# Patient Record
Sex: MALE | Race: WHITE | NOT HISPANIC OR LATINO | Employment: FULL TIME | URBAN - METROPOLITAN AREA
[De-identification: names, ages, dates, MRNs, and addresses within clinical notes are randomized per-mention and may not be internally consistent; named-entity substitution may affect disease eponyms.]

---

## 2018-10-24 ENCOUNTER — HOSPITAL ENCOUNTER (OUTPATIENT)
Facility: HOSPITAL | Age: 53
Discharge: STILL ADMITTED OR ADMINISTRATIVE | End: 2018-10-26
Attending: EMERGENCY MEDICINE | Admitting: ORTHOPAEDIC SURGERY
Payer: COMMERCIAL

## 2018-10-24 ENCOUNTER — ANESTHESIA EVENT (OUTPATIENT)
Dept: SURGERY | Facility: HOSPITAL | Age: 53
End: 2018-10-24
Payer: COMMERCIAL

## 2018-10-24 DIAGNOSIS — T14.90XA TRAUMA: ICD-10-CM

## 2018-10-24 DIAGNOSIS — T14.8XXA FRACTURE: ICD-10-CM

## 2018-10-24 DIAGNOSIS — Z01.811 PRE-OP CHEST EXAM: ICD-10-CM

## 2018-10-24 DIAGNOSIS — S82.201A CLOSED FRACTURE OF RIGHT TIBIA AND FIBULA, INITIAL ENCOUNTER: Primary | ICD-10-CM

## 2018-10-24 DIAGNOSIS — S82.401A CLOSED FRACTURE OF RIGHT TIBIA AND FIBULA, INITIAL ENCOUNTER: Primary | ICD-10-CM

## 2018-10-24 DIAGNOSIS — S82.221A CLOSED DISPLACED TRANSVERSE FRACTURE OF SHAFT OF RIGHT TIBIA, INITIAL ENCOUNTER: ICD-10-CM

## 2018-10-24 LAB
ABO + RH BLD: NORMAL
ALBUMIN SERPL BCP-MCNC: 3.9 G/DL
ALP SERPL-CCNC: 74 U/L
ALT SERPL W/O P-5'-P-CCNC: 20 U/L
ANION GAP SERPL CALC-SCNC: 8 MMOL/L
AST SERPL-CCNC: 19 U/L
BASOPHILS # BLD AUTO: 0.03 K/UL
BASOPHILS NFR BLD: 0.4 %
BILIRUB SERPL-MCNC: 0.3 MG/DL
BLD GP AB SCN CELLS X3 SERPL QL: NORMAL
BUN SERPL-MCNC: 14 MG/DL
CALCIUM SERPL-MCNC: 9.1 MG/DL
CHLORIDE SERPL-SCNC: 106 MMOL/L
CO2 SERPL-SCNC: 25 MMOL/L
CREAT SERPL-MCNC: 1 MG/DL
DIFFERENTIAL METHOD: ABNORMAL
EOSINOPHIL # BLD AUTO: 0.2 K/UL
EOSINOPHIL NFR BLD: 2.2 %
ERYTHROCYTE [DISTWIDTH] IN BLOOD BY AUTOMATED COUNT: 12.4 %
EST. GFR  (AFRICAN AMERICAN): >60 ML/MIN/1.73 M^2
EST. GFR  (NON AFRICAN AMERICAN): >60 ML/MIN/1.73 M^2
ESTIMATED AVG GLUCOSE: 108 MG/DL
GLUCOSE SERPL-MCNC: 103 MG/DL
HBA1C MFR BLD HPLC: 5.4 %
HCT VFR BLD AUTO: 43.4 %
HGB BLD-MCNC: 14 G/DL
IMM GRANULOCYTES # BLD AUTO: 0.01 K/UL
IMM GRANULOCYTES NFR BLD AUTO: 0.1 %
INR PPP: 1
LYMPHOCYTES # BLD AUTO: 1.1 K/UL
LYMPHOCYTES NFR BLD: 15.7 %
MCH RBC QN AUTO: 27.4 PG
MCHC RBC AUTO-ENTMCNC: 32.3 G/DL
MCV RBC AUTO: 85 FL
MONOCYTES # BLD AUTO: 0.6 K/UL
MONOCYTES NFR BLD: 8.5 %
NEUTROPHILS # BLD AUTO: 5.2 K/UL
NEUTROPHILS NFR BLD: 73.1 %
NRBC BLD-RTO: 0 /100 WBC
PLATELET # BLD AUTO: 188 K/UL
PMV BLD AUTO: 10.1 FL
POTASSIUM SERPL-SCNC: 4 MMOL/L
PREALB SERPL-MCNC: 29 MG/DL
PROT SERPL-MCNC: 6.9 G/DL
PROTHROMBIN TIME: 10.1 SEC
RBC # BLD AUTO: 5.11 M/UL
SODIUM SERPL-SCNC: 139 MMOL/L
TRANSFERRIN SERPL-MCNC: 220 MG/DL
WBC # BLD AUTO: 7.14 K/UL

## 2018-10-24 PROCEDURE — 63600175 PHARM REV CODE 636 W HCPCS: Performed by: STUDENT IN AN ORGANIZED HEALTH CARE EDUCATION/TRAINING PROGRAM

## 2018-10-24 PROCEDURE — 86901 BLOOD TYPING SEROLOGIC RH(D): CPT

## 2018-10-24 PROCEDURE — 84466 ASSAY OF TRANSFERRIN: CPT

## 2018-10-24 PROCEDURE — 63600175 PHARM REV CODE 636 W HCPCS: Performed by: EMERGENCY MEDICINE

## 2018-10-24 PROCEDURE — 96374 THER/PROPH/DIAG INJ IV PUSH: CPT

## 2018-10-24 PROCEDURE — 63600175 PHARM REV CODE 636 W HCPCS: Performed by: ORTHOPAEDIC SURGERY

## 2018-10-24 PROCEDURE — 80053 COMPREHEN METABOLIC PANEL: CPT

## 2018-10-24 PROCEDURE — 93010 ELECTROCARDIOGRAM REPORT: CPT | Mod: ,,, | Performed by: INTERNAL MEDICINE

## 2018-10-24 PROCEDURE — 85025 COMPLETE CBC W/AUTO DIFF WBC: CPT

## 2018-10-24 PROCEDURE — 84134 ASSAY OF PREALBUMIN: CPT

## 2018-10-24 PROCEDURE — 99285 EMERGENCY DEPT VISIT HI MDM: CPT | Mod: ,,, | Performed by: EMERGENCY MEDICINE

## 2018-10-24 PROCEDURE — 99285 EMERGENCY DEPT VISIT HI MDM: CPT | Mod: 25

## 2018-10-24 PROCEDURE — 96376 TX/PRO/DX INJ SAME DRUG ADON: CPT

## 2018-10-24 PROCEDURE — 83036 HEMOGLOBIN GLYCOSYLATED A1C: CPT

## 2018-10-24 PROCEDURE — 85610 PROTHROMBIN TIME: CPT

## 2018-10-24 PROCEDURE — 96375 TX/PRO/DX INJ NEW DRUG ADDON: CPT

## 2018-10-24 RX ORDER — FENTANYL CITRATE 50 UG/ML
50 INJECTION, SOLUTION INTRAMUSCULAR; INTRAVENOUS ONCE
Status: COMPLETED | OUTPATIENT
Start: 2018-10-24 | End: 2018-10-24

## 2018-10-24 RX ORDER — FENTANYL CITRATE 50 UG/ML
100 INJECTION, SOLUTION INTRAMUSCULAR; INTRAVENOUS
Status: DISCONTINUED | OUTPATIENT
Start: 2018-10-24 | End: 2018-10-25 | Stop reason: HOSPADM

## 2018-10-24 RX ORDER — LORAZEPAM 2 MG/ML
3 INJECTION INTRAMUSCULAR ONCE
Status: COMPLETED | OUTPATIENT
Start: 2018-10-24 | End: 2018-10-24

## 2018-10-24 RX ORDER — FENTANYL CITRATE 50 UG/ML
100 INJECTION, SOLUTION INTRAMUSCULAR; INTRAVENOUS
Status: COMPLETED | OUTPATIENT
Start: 2018-10-24 | End: 2018-10-24

## 2018-10-24 RX ORDER — FENTANYL CITRATE 50 UG/ML
50 INJECTION, SOLUTION INTRAMUSCULAR; INTRAVENOUS ONCE
Status: DISCONTINUED | OUTPATIENT
Start: 2018-10-24 | End: 2018-10-24

## 2018-10-24 RX ADMIN — FENTANYL CITRATE 50 MCG: 50 INJECTION INTRAMUSCULAR; INTRAVENOUS at 05:10

## 2018-10-24 RX ADMIN — FENTANYL CITRATE 100 MCG: 50 INJECTION INTRAMUSCULAR; INTRAVENOUS at 08:10

## 2018-10-24 RX ADMIN — LORAZEPAM 3 MG: 2 INJECTION INTRAMUSCULAR; INTRAVENOUS at 08:10

## 2018-10-24 RX ADMIN — FENTANYL CITRATE 50 MCG: 50 INJECTION INTRAMUSCULAR; INTRAVENOUS at 06:10

## 2018-10-24 NOTE — ED PROVIDER NOTES
Encounter Date: 10/24/2018       History     Chief Complaint   Patient presents with    Leg Injury     Pt slipped while climbing on a piece of equipment and twisted his right lower leg.      This is a 52 year old male presenting after fall at work. He has no PMH. 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 His right leg hit the ground first and patient was able to crouch and ease himself to the ground on his right side. He immediately felt sharp non-radiating pain isolated to the distal right leg just proximal to the ankle with associated swelling. He was able to yell to his employees for assistance to call EMS; right leg placed in box-splint. He denies head injury, LOC,  pain anywhere else on his body, denies numbness or tingling of the right foot, denies pain at the right knee, denies taking blood thinners at home. His last meal was at 1130 this morning.           Review of patient's allergies indicates: No Known Allergies    History reviewed. No pertinent past medical history.  Past Surgical History:   Procedure Laterality Date    EYE SURGERY       History reviewed. No pertinent family history.  Social History     Tobacco Use    Smoking status: Not on file   Substance Use Topics    Alcohol use: Not on file    Drug use: Not on file     Review of Systems   Constitutional: Negative for appetite change, chills, diaphoresis and fever.   HENT: Negative for sore throat and trouble swallowing.    Eyes: Negative for photophobia, pain and discharge.   Respiratory: Negative for cough and shortness of breath.    Cardiovascular: Positive for leg swelling. Negative for chest pain and palpitations.   Gastrointestinal: Negative for abdominal pain, diarrhea, nausea and vomiting.   Genitourinary: Negative for difficulty urinating.   Musculoskeletal: Positive for joint swelling. Negative for back pain, myalgias and neck pain.   Skin: Negative for pallor and rash.    Neurological: Negative for dizziness, light-headedness, numbness and headaches.       Physical Exam     Initial Vitals [10/24/18 1658]   BP Pulse Resp Temp SpO2   (!) 150/84 84 18 -- 99 %      MAP       --         Physical Exam    Constitutional: Vital signs are normal. He appears well-developed and well-nourished. No distress.    male reclined in [facility].    HENT:   Head: Normocephalic and atraumatic.   Mouth/Throat: Oropharynx is clear and moist and mucous membranes are normal.   Eyes: Conjunctivae are normal. Pupils are equal, round, and reactive to light.   Neck: Full passive range of motion without pain. Neck supple.   Cardiovascular: Normal rate, regular rhythm and normal heart sounds.   No murmur heard.  Pulses:       Dorsalis pedis pulses are 2+ on the right side, and 2+ on the left side.   Pulmonary/Chest: Effort normal and breath sounds normal.   Abdominal: Soft. Normal appearance and bowel sounds are normal. There is no tenderness.   Musculoskeletal:   There is swelling of the distal right lower leg with tenderness to palpation around the area. No tenderness to palpation or swelling around the tibular head.    Neurological: He is alert and oriented to person, place, and time. No cranial nerve deficit or sensory deficit. GCS eye subscore is 4. GCS verbal subscore is 5. GCS motor subscore is 6. He displays no Babinski's sign on the right side.   Sensation intact within the right foot. Any to move toes to command.    Skin: Skin is warm and dry. No pallor.         ED Course   Procedures  Labs Reviewed   CBC W/ AUTO DIFFERENTIAL - Abnormal; Notable for the following components:       Result Value    Gran% 73.1 (*)     Lymph% 15.7 (*)     All other components within normal limits   COMPREHENSIVE METABOLIC PANEL   PREALBUMIN   PROTIME-INR   TRANSFERRIN   HEMOGLOBIN A1C   HEMOGLOBIN A1C    Narrative:     ADD ON GHGB PER: ANGE TOUSSAINT MD  10/24/2018  18:49   ADD ON TRSF PER: ANGE TOUSSAINT MD   10/24/2018  19:05    TRANSFERRIN    Narrative:     ADD ON GHGB PER: ANGE TOUSSAINT MD  10/24/2018  18:49   ADD ON TRSF PER: ANGE TOUSSAINT MD  10/24/2018  19:05    TYPE AND SCREEN PREOP          Imaging Results          X-Ray Knee 1 or 2 View Right (Final result)  Result time 10/24/18 18:32:01    Final result by Katharine Weinberg MD (10/24/18 18:32:01)                 Impression:      No knee fracture identified.  Known tib/fib fractures are outside of the field of view.      Electronically signed by: Katharine Weinberg MD  Date:    10/24/2018  Time:    18:32             Narrative:    EXAMINATION:  XR KNEE 1 OR 2 VIEW RIGHT    CLINICAL HISTORY:  tib fib fracture;    TECHNIQUE:  AP and lateral views of the right knee were performed.    COMPARISON:  Prior tibia/fibular radiographs from earlier same day    FINDINGS:  There is no evidence of acute fracture, dislocation, or bone destruction involving the knee.  No joint effusion is present.                               X-Ray Chest AP Portable (Final result)  Result time 10/24/18 17:46:14    Final result by Bi Tang MD (10/24/18 17:46:14)                 Impression:      No acute abnormality.      Electronically signed by: Bi Tang MD  Date:    10/24/2018  Time:    17:46             Narrative:    EXAMINATION:  XR CHEST AP PORTABLE    CLINICAL HISTORY:  Injury, unspecified, initial encounter    TECHNIQUE:  Single frontal view of the chest was performed.    COMPARISON:  None    FINDINGS:  The lungs are clear, with normal appearance of pulmonary vasculature and no pleural effusion or pneumothorax.    The cardiac silhouette is normal in size. The hilar and mediastinal contours are unremarkable.    Bones are intact.                               X-Ray Tibia Fibula 2 View Right (Final result)  Result time 10/24/18 17:56:26    Final result by Bi Tang MD (10/24/18 17:56:26)                 Impression:      Distal right tibia and fibula acute comminuted  fractures, as above.      Electronically signed by: Bi Tang MD  Date:    10/24/2018  Time:    17:56             Narrative:    EXAMINATION:  XR TIBIA FIBULA 2 VIEW RIGHT; XR ANKLE COMPLETE 3 VIEW RIGHT    CLINICAL HISTORY:  Injury, unspecified, initial encounter    TECHNIQUE:  AP and lateral views of the right tibia and fibula; AP, lateral and oblique views of the right ankle.    COMPARISON:  None.    FINDINGS:  Bones are well mineralized.  Acute comminuted fractures involving the distal diaphysis of the right tibia and also right fibula with mild impaction, mild overlap, displacement and apex angulation towards the ventral and medial aspect.  No dislocation or destructive osseous process.  There is associated overlying soft tissue swelling.  No subcutaneous emphysema or radiodense retained foreign body.                               X-Ray Ankle Complete Right (Final result)  Result time 10/24/18 17:56:26    Final result by Bi Tang MD (10/24/18 17:56:26)                 Impression:      Distal right tibia and fibula acute comminuted fractures, as above.      Electronically signed by: Bi Tang MD  Date:    10/24/2018  Time:    17:56             Narrative:    EXAMINATION:  XR TIBIA FIBULA 2 VIEW RIGHT; XR ANKLE COMPLETE 3 VIEW RIGHT    CLINICAL HISTORY:  Injury, unspecified, initial encounter    TECHNIQUE:  AP and lateral views of the right tibia and fibula; AP, lateral and oblique views of the right ankle.    COMPARISON:  None.    FINDINGS:  Bones are well mineralized.  Acute comminuted fractures involving the distal diaphysis of the right tibia and also right fibula with mild impaction, mild overlap, displacement and apex angulation towards the ventral and medial aspect.  No dislocation or destructive osseous process.  There is associated overlying soft tissue swelling.  No subcutaneous emphysema or radiodense retained foreign body.                                 Medical Decision Making:   Initial  Assessment:   This is a stable 52 year old male with no PMH presenting after approximately 4 foot fall onto his right lower extremity with isolated pain and swelling to the distal right leg just proximal to the ankle who is without neurologic deficits.   Differential Diagnosis:   Differential diagnoses include, but are not limited too, tib-fib fracture vs ankle fracture vs foot fracture.   ED Management:  Type and screen, CBC, CMP ordered.   XR of right leg, and ankle ordered.   Pain controlled ordered with Fentanyl; continuous pulse-ox ordered.     5:50 PM  XR significant for tib-fib fracture; orthopedic surgery consulted.   No improvement in pain with initial Fentanyl; additional ordered.   All ordered labs unremarkable.     6:26 PM  Spoke with orthopedic surgery; patient will need nail and admit to orthopedics.     8:10 PM  Additional Fentanyl given due to continued pain.     9:28 PM  Right lower extremity splinted by orthopedics; Ativan given prior to procedure.   Pain controlled.     12:14 AM  Admitted to orthopedics.               Attending Attestation:   Physician Attestation Statement for Resident:  As the supervising MD . -: dw with resident.  Patient seen/ exam by me.  Patient with mechanical slip and fall from a 4 ft height with tip hip fracture on the right.  We spoke with Orthopedics.  Neurovascularly intact distally.  No other injury. No head injury. Patient will be admitted for surgery tomorrow.                      Clinical Impression:   The primary encounter diagnosis was Closed fracture of right tibia and fibula, initial encounter. Diagnoses of Trauma, Fracture, and Pre-op chest exam were also pertinent to this visit.                             Guzman Joyner MD  Resident  10/25/18 0015

## 2018-10-24 NOTE — ED NOTES
"Pt identifiers checked and accurate with Emely Albrecht      Pt report to ED with complaints of right lower leg pain. 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 Pt denies numbness, tingling, head trauma, dizziness, LOC, N/V.     LOC: The patient is awake, alert and aware of environment with an appropriate affect, the patient is oriented x 3 and speaking appropriately.  APPEARANCE: Patient resting comfortably and in no acute distress, patient is clean and well groomed  SKIN: The skin is warm and dry, color consistent with ethnicity, skin intact, no breakdown or bruising noted.   MUSCULOSKELETAL: Patient moving all extremities well except right leg, deformity and swelling noted to lower right leg.   RESPIRATORY: Airway is open and patent, respirations are spontaneous, patient has a normal effort and rate, no accessory muscle use noted.   NEUROLOGIC: PERRL,  3mm bilaterally, eyes open spontaneously, behavior appropriate to situation, follows commands, normal sensation in all extremities when touched with a finger. +2 pedal pulses intact bilaterally. Pt denies numbness, tingling to right foot.     "

## 2018-10-25 ENCOUNTER — ANESTHESIA (OUTPATIENT)
Dept: SURGERY | Facility: HOSPITAL | Age: 53
End: 2018-10-25
Payer: COMMERCIAL

## 2018-10-25 PROBLEM — S82.401A CLOSED FRACTURE OF RIGHT FIBULA AND TIBIA: Status: ACTIVE | Noted: 2018-10-25

## 2018-10-25 PROBLEM — S82.201A CLOSED FRACTURE OF RIGHT FIBULA AND TIBIA: Status: ACTIVE | Noted: 2018-10-25

## 2018-10-25 LAB
ANION GAP SERPL CALC-SCNC: 5 MMOL/L
BASOPHILS # BLD AUTO: 0.03 K/UL
BASOPHILS NFR BLD: 0.3 %
BUN SERPL-MCNC: 14 MG/DL
CALCIUM SERPL-MCNC: 8.6 MG/DL
CHLORIDE SERPL-SCNC: 107 MMOL/L
CO2 SERPL-SCNC: 24 MMOL/L
CREAT SERPL-MCNC: 0.9 MG/DL
DIFFERENTIAL METHOD: ABNORMAL
EOSINOPHIL # BLD AUTO: 0.1 K/UL
EOSINOPHIL NFR BLD: 0.8 %
ERYTHROCYTE [DISTWIDTH] IN BLOOD BY AUTOMATED COUNT: 12.6 %
EST. GFR  (AFRICAN AMERICAN): >60 ML/MIN/1.73 M^2
EST. GFR  (NON AFRICAN AMERICAN): >60 ML/MIN/1.73 M^2
GLUCOSE SERPL-MCNC: 145 MG/DL
HCT VFR BLD AUTO: 42.5 %
HGB BLD-MCNC: 13.6 G/DL
IMM GRANULOCYTES # BLD AUTO: 0.04 K/UL
IMM GRANULOCYTES NFR BLD AUTO: 0.4 %
LYMPHOCYTES # BLD AUTO: 1.1 K/UL
LYMPHOCYTES NFR BLD: 10.7 %
MCH RBC QN AUTO: 27.8 PG
MCHC RBC AUTO-ENTMCNC: 32 G/DL
MCV RBC AUTO: 87 FL
MONOCYTES # BLD AUTO: 0.4 K/UL
MONOCYTES NFR BLD: 3.4 %
NEUTROPHILS # BLD AUTO: 8.7 K/UL
NEUTROPHILS NFR BLD: 84.4 %
NRBC BLD-RTO: 0 /100 WBC
PLATELET # BLD AUTO: 200 K/UL
PMV BLD AUTO: 10.1 FL
POTASSIUM SERPL-SCNC: 4.2 MMOL/L
RBC # BLD AUTO: 4.9 M/UL
SODIUM SERPL-SCNC: 136 MMOL/L
WBC # BLD AUTO: 10.29 K/UL

## 2018-10-25 PROCEDURE — 27759 TREATMENT OF TIBIA FRACTURE: CPT | Mod: RT,,, | Performed by: ORTHOPAEDIC SURGERY

## 2018-10-25 PROCEDURE — 99220 PR INITIAL OBSERVATION CARE,LEVL III: CPT | Mod: 57,,, | Performed by: ORTHOPAEDIC SURGERY

## 2018-10-25 PROCEDURE — 27000221 HC OXYGEN, UP TO 24 HOURS

## 2018-10-25 PROCEDURE — G0378 HOSPITAL OBSERVATION PER HR: HCPCS

## 2018-10-25 PROCEDURE — 85025 COMPLETE CBC W/AUTO DIFF WBC: CPT

## 2018-10-25 PROCEDURE — C1713 ANCHOR/SCREW BN/BN,TIS/BN: HCPCS | Performed by: ORTHOPAEDIC SURGERY

## 2018-10-25 PROCEDURE — 36000711: Performed by: ORTHOPAEDIC SURGERY

## 2018-10-25 PROCEDURE — 36415 COLL VENOUS BLD VENIPUNCTURE: CPT

## 2018-10-25 PROCEDURE — 25000003 PHARM REV CODE 250: Performed by: STUDENT IN AN ORGANIZED HEALTH CARE EDUCATION/TRAINING PROGRAM

## 2018-10-25 PROCEDURE — 63600175 PHARM REV CODE 636 W HCPCS: Performed by: STUDENT IN AN ORGANIZED HEALTH CARE EDUCATION/TRAINING PROGRAM

## 2018-10-25 PROCEDURE — C1769 GUIDE WIRE: HCPCS | Performed by: ORTHOPAEDIC SURGERY

## 2018-10-25 PROCEDURE — 27201423 OPTIME MED/SURG SUP & DEVICES STERILE SUPPLY: Performed by: ORTHOPAEDIC SURGERY

## 2018-10-25 PROCEDURE — 80048 BASIC METABOLIC PNL TOTAL CA: CPT

## 2018-10-25 PROCEDURE — 37000008 HC ANESTHESIA 1ST 15 MINUTES: Performed by: ORTHOPAEDIC SURGERY

## 2018-10-25 PROCEDURE — 25000003 PHARM REV CODE 250: Performed by: ORTHOPAEDIC SURGERY

## 2018-10-25 PROCEDURE — 37000009 HC ANESTHESIA EA ADD 15 MINS: Performed by: ORTHOPAEDIC SURGERY

## 2018-10-25 PROCEDURE — 36000710: Performed by: ORTHOPAEDIC SURGERY

## 2018-10-25 PROCEDURE — 63600175 PHARM REV CODE 636 W HCPCS: Performed by: ORTHOPAEDIC SURGERY

## 2018-10-25 PROCEDURE — 63600175 PHARM REV CODE 636 W HCPCS: Performed by: NURSE ANESTHETIST, CERTIFIED REGISTERED

## 2018-10-25 PROCEDURE — D9220A PRA ANESTHESIA: Mod: ,,, | Performed by: ANESTHESIOLOGY

## 2018-10-25 PROCEDURE — 71000033 HC RECOVERY, INTIAL HOUR: Performed by: ORTHOPAEDIC SURGERY

## 2018-10-25 PROCEDURE — 25000003 PHARM REV CODE 250: Performed by: NURSE ANESTHETIST, CERTIFIED REGISTERED

## 2018-10-25 PROCEDURE — 71000039 HC RECOVERY, EACH ADD'L HOUR: Performed by: ORTHOPAEDIC SURGERY

## 2018-10-25 DEVICE — SCREW STRDRV REC T25 5X32 TTNM: Type: IMPLANTABLE DEVICE | Site: TIBIA | Status: FUNCTIONAL

## 2018-10-25 DEVICE — SCREW LOCK T25 5.0X38MM: Type: IMPLANTABLE DEVICE | Site: TIBIA | Status: FUNCTIONAL

## 2018-10-25 DEVICE — IMPLANTABLE DEVICE: Type: IMPLANTABLE DEVICE | Site: TIBIA | Status: FUNCTIONAL

## 2018-10-25 DEVICE — SCREW STRDRV REC T25 5X42 TTNM: Type: IMPLANTABLE DEVICE | Site: TIBIA | Status: FUNCTIONAL

## 2018-10-25 RX ORDER — CEFAZOLIN SODIUM 1 G/3ML
2 INJECTION, POWDER, FOR SOLUTION INTRAMUSCULAR; INTRAVENOUS
Status: COMPLETED | OUTPATIENT
Start: 2018-10-25 | End: 2018-10-26

## 2018-10-25 RX ORDER — LIDOCAINE HYDROCHLORIDE 10 MG/ML
1 INJECTION, SOLUTION EPIDURAL; INFILTRATION; INTRACAUDAL; PERINEURAL ONCE
Status: DISCONTINUED | OUTPATIENT
Start: 2018-10-25 | End: 2018-10-25

## 2018-10-25 RX ORDER — METHOCARBAMOL 500 MG/1
1500 TABLET, FILM COATED ORAL 4 TIMES DAILY
Status: DISCONTINUED | OUTPATIENT
Start: 2018-10-25 | End: 2018-10-26 | Stop reason: HOSPADM

## 2018-10-25 RX ORDER — ACETAMINOPHEN 325 MG/1
650 TABLET ORAL EVERY 8 HOURS PRN
Status: DISCONTINUED | OUTPATIENT
Start: 2018-10-25 | End: 2018-10-25

## 2018-10-25 RX ORDER — ACETAMINOPHEN 500 MG
1000 TABLET ORAL EVERY 8 HOURS
Status: DISCONTINUED | OUTPATIENT
Start: 2018-10-26 | End: 2018-10-26 | Stop reason: HOSPADM

## 2018-10-25 RX ORDER — PROPOFOL 10 MG/ML
VIAL (ML) INTRAVENOUS
Status: DISCONTINUED | OUTPATIENT
Start: 2018-10-25 | End: 2018-10-25

## 2018-10-25 RX ORDER — HYDROMORPHONE HYDROCHLORIDE 1 MG/ML
0.2 INJECTION, SOLUTION INTRAMUSCULAR; INTRAVENOUS; SUBCUTANEOUS EVERY 5 MIN PRN
Status: DISCONTINUED | OUTPATIENT
Start: 2018-10-25 | End: 2018-10-25 | Stop reason: HOSPADM

## 2018-10-25 RX ORDER — PREGABALIN 50 MG/1
150 CAPSULE ORAL NIGHTLY
Status: DISCONTINUED | OUTPATIENT
Start: 2018-10-25 | End: 2018-10-26 | Stop reason: HOSPADM

## 2018-10-25 RX ORDER — MUPIROCIN 20 MG/G
OINTMENT TOPICAL
Status: DISCONTINUED | OUTPATIENT
Start: 2018-10-25 | End: 2018-10-25 | Stop reason: HOSPADM

## 2018-10-25 RX ORDER — ROCURONIUM BROMIDE 10 MG/ML
INJECTION, SOLUTION INTRAVENOUS
Status: DISCONTINUED | OUTPATIENT
Start: 2018-10-25 | End: 2018-10-25

## 2018-10-25 RX ORDER — RAMELTEON 8 MG/1
8 TABLET ORAL NIGHTLY PRN
Status: DISCONTINUED | OUTPATIENT
Start: 2018-10-25 | End: 2018-10-26 | Stop reason: HOSPADM

## 2018-10-25 RX ORDER — KETAMINE HYDROCHLORIDE 10 MG/ML
INJECTION, SOLUTION INTRAMUSCULAR; INTRAVENOUS
Status: DISCONTINUED | OUTPATIENT
Start: 2018-10-25 | End: 2018-10-25

## 2018-10-25 RX ORDER — CEFAZOLIN SODIUM 1 G/3ML
2 INJECTION, POWDER, FOR SOLUTION INTRAMUSCULAR; INTRAVENOUS
Status: COMPLETED | OUTPATIENT
Start: 2018-10-25 | End: 2018-10-25

## 2018-10-25 RX ORDER — SODIUM CHLORIDE 9 MG/ML
INJECTION, SOLUTION INTRAVENOUS CONTINUOUS
Status: ACTIVE | OUTPATIENT
Start: 2018-10-25 | End: 2018-10-26

## 2018-10-25 RX ORDER — DOCUSATE SODIUM 100 MG/1
100 CAPSULE, LIQUID FILLED ORAL 3 TIMES DAILY
Qty: 90 CAPSULE | Refills: 0 | Status: SHIPPED | OUTPATIENT
Start: 2018-10-25 | End: 2018-12-19

## 2018-10-25 RX ORDER — POLYETHYLENE GLYCOL 3350 17 G/17G
17 POWDER, FOR SOLUTION ORAL DAILY
Qty: 1530 G | Refills: 0 | Status: SHIPPED | OUTPATIENT
Start: 2018-10-25 | End: 2018-12-19

## 2018-10-25 RX ORDER — OXYCODONE HYDROCHLORIDE 5 MG/1
10 TABLET ORAL EVERY 4 HOURS PRN
Status: DISCONTINUED | OUTPATIENT
Start: 2018-10-25 | End: 2018-10-25

## 2018-10-25 RX ORDER — ONDANSETRON HYDROCHLORIDE 2 MG/ML
INJECTION, SOLUTION INTRAMUSCULAR; INTRAVENOUS
Status: DISCONTINUED | OUTPATIENT
Start: 2018-10-25 | End: 2018-10-25

## 2018-10-25 RX ORDER — OXYCODONE HYDROCHLORIDE 10 MG/1
10 TABLET ORAL
Status: DISCONTINUED | OUTPATIENT
Start: 2018-10-25 | End: 2018-10-26 | Stop reason: HOSPADM

## 2018-10-25 RX ORDER — GLYCOPYRROLATE 0.2 MG/ML
INJECTION INTRAMUSCULAR; INTRAVENOUS
Status: DISCONTINUED | OUTPATIENT
Start: 2018-10-25 | End: 2018-10-25

## 2018-10-25 RX ORDER — HYDROMORPHONE HYDROCHLORIDE 1 MG/ML
0.5 INJECTION, SOLUTION INTRAMUSCULAR; INTRAVENOUS; SUBCUTANEOUS
Status: DISCONTINUED | OUTPATIENT
Start: 2018-10-25 | End: 2018-10-26 | Stop reason: HOSPADM

## 2018-10-25 RX ORDER — OXYCODONE AND ACETAMINOPHEN 10; 325 MG/1; MG/1
1 TABLET ORAL EVERY 4 HOURS PRN
Qty: 43 TABLET | Refills: 0 | Status: SHIPPED | OUTPATIENT
Start: 2018-10-25 | End: 2018-11-07

## 2018-10-25 RX ORDER — DEXAMETHASONE SODIUM PHOSPHATE 4 MG/ML
INJECTION, SOLUTION INTRA-ARTICULAR; INTRALESIONAL; INTRAMUSCULAR; INTRAVENOUS; SOFT TISSUE
Status: DISCONTINUED | OUTPATIENT
Start: 2018-10-25 | End: 2018-10-25

## 2018-10-25 RX ORDER — PHENYLEPHRINE HYDROCHLORIDE 10 MG/ML
INJECTION INTRAVENOUS
Status: DISCONTINUED | OUTPATIENT
Start: 2018-10-25 | End: 2018-10-25

## 2018-10-25 RX ORDER — DOCUSATE SODIUM 100 MG/1
100 CAPSULE, LIQUID FILLED ORAL 3 TIMES DAILY
Status: DISCONTINUED | OUTPATIENT
Start: 2018-10-25 | End: 2018-10-26 | Stop reason: HOSPADM

## 2018-10-25 RX ORDER — BISACODYL 10 MG
10 SUPPOSITORY, RECTAL RECTAL DAILY PRN
Status: DISCONTINUED | OUTPATIENT
Start: 2018-10-25 | End: 2018-10-26 | Stop reason: HOSPADM

## 2018-10-25 RX ORDER — FENTANYL CITRATE 50 UG/ML
100 INJECTION, SOLUTION INTRAMUSCULAR; INTRAVENOUS
Status: DISCONTINUED | OUTPATIENT
Start: 2018-10-25 | End: 2018-10-25

## 2018-10-25 RX ORDER — ONDANSETRON 8 MG/1
8 TABLET, ORALLY DISINTEGRATING ORAL EVERY 8 HOURS PRN
Status: DISCONTINUED | OUTPATIENT
Start: 2018-10-25 | End: 2018-10-26 | Stop reason: HOSPADM

## 2018-10-25 RX ORDER — POLYETHYLENE GLYCOL 3350 17 G/17G
17 POWDER, FOR SOLUTION ORAL DAILY
Status: DISCONTINUED | OUTPATIENT
Start: 2018-10-25 | End: 2018-10-26 | Stop reason: HOSPADM

## 2018-10-25 RX ORDER — MORPHINE SULFATE 4 MG/ML
3 INJECTION, SOLUTION INTRAMUSCULAR; INTRAVENOUS EVERY 4 HOURS PRN
Status: DISCONTINUED | OUTPATIENT
Start: 2018-10-25 | End: 2018-10-25

## 2018-10-25 RX ORDER — NEOSTIGMINE METHYLSULFATE 1 MG/ML
INJECTION, SOLUTION INTRAVENOUS
Status: DISCONTINUED | OUTPATIENT
Start: 2018-10-25 | End: 2018-10-25

## 2018-10-25 RX ORDER — OXYCODONE HYDROCHLORIDE 5 MG/1
5 TABLET ORAL EVERY 4 HOURS PRN
Status: DISCONTINUED | OUTPATIENT
Start: 2018-10-25 | End: 2018-10-25

## 2018-10-25 RX ORDER — METHOCARBAMOL 750 MG/1
750 TABLET, FILM COATED ORAL 4 TIMES DAILY
Qty: 37 TABLET | Refills: 0 | Status: SHIPPED | OUTPATIENT
Start: 2018-10-25 | End: 2018-11-04

## 2018-10-25 RX ORDER — SODIUM CHLORIDE 0.9 % (FLUSH) 0.9 %
3 SYRINGE (ML) INJECTION
Status: DISCONTINUED | OUTPATIENT
Start: 2018-10-25 | End: 2018-10-26 | Stop reason: HOSPADM

## 2018-10-25 RX ORDER — ACETAMINOPHEN 10 MG/ML
1000 INJECTION, SOLUTION INTRAVENOUS EVERY 8 HOURS
Status: COMPLETED | OUTPATIENT
Start: 2018-10-25 | End: 2018-10-26

## 2018-10-25 RX ORDER — LIDOCAINE HCL/PF 100 MG/5ML
SYRINGE (ML) INTRAVENOUS
Status: DISCONTINUED | OUTPATIENT
Start: 2018-10-25 | End: 2018-10-25

## 2018-10-25 RX ORDER — PROMETHAZINE HYDROCHLORIDE 25 MG/1
25 TABLET ORAL EVERY 4 HOURS PRN
Qty: 50 TABLET | Refills: 0 | Status: SHIPPED | OUTPATIENT
Start: 2018-10-25 | End: 2018-11-07

## 2018-10-25 RX ORDER — ASPIRIN 81 MG/1
81 TABLET ORAL 2 TIMES DAILY
Status: DISCONTINUED | OUTPATIENT
Start: 2018-10-25 | End: 2018-10-26 | Stop reason: HOSPADM

## 2018-10-25 RX ORDER — FENTANYL CITRATE 50 UG/ML
INJECTION, SOLUTION INTRAMUSCULAR; INTRAVENOUS
Status: DISCONTINUED | OUTPATIENT
Start: 2018-10-25 | End: 2018-10-25

## 2018-10-25 RX ORDER — OXYCODONE HYDROCHLORIDE 5 MG/1
5 TABLET ORAL
Status: DISCONTINUED | OUTPATIENT
Start: 2018-10-25 | End: 2018-10-26 | Stop reason: HOSPADM

## 2018-10-25 RX ORDER — BACITRACIN ZINC 500 UNIT/G
OINTMENT (GRAM) TOPICAL
Status: DISCONTINUED | OUTPATIENT
Start: 2018-10-25 | End: 2018-10-25 | Stop reason: HOSPADM

## 2018-10-25 RX ORDER — SODIUM CHLORIDE 9 MG/ML
INJECTION, SOLUTION INTRAVENOUS CONTINUOUS
Status: DISCONTINUED | OUTPATIENT
Start: 2018-10-25 | End: 2018-10-25

## 2018-10-25 RX ORDER — METHOCARBAMOL 500 MG/1
1000 TABLET, FILM COATED ORAL 4 TIMES DAILY
Status: DISCONTINUED | OUTPATIENT
Start: 2018-10-28 | End: 2018-10-26 | Stop reason: HOSPADM

## 2018-10-25 RX ORDER — MIDAZOLAM HYDROCHLORIDE 1 MG/ML
INJECTION INTRAMUSCULAR; INTRAVENOUS
Status: DISCONTINUED | OUTPATIENT
Start: 2018-10-25 | End: 2018-10-25

## 2018-10-25 RX ORDER — OXYCODONE HYDROCHLORIDE 5 MG/1
5 TABLET ORAL EVERY 4 HOURS PRN
Qty: 43 TABLET | Refills: 0 | Status: SHIPPED | OUTPATIENT
Start: 2018-10-25 | End: 2018-12-19

## 2018-10-25 RX ADMIN — ACETAMINOPHEN 1000 MG: 10 INJECTION, SOLUTION INTRAVENOUS at 01:10

## 2018-10-25 RX ADMIN — PHENYLEPHRINE HYDROCHLORIDE 100 MCG: 10 INJECTION INTRAVENOUS at 09:10

## 2018-10-25 RX ADMIN — SODIUM CHLORIDE: 0.9 INJECTION, SOLUTION INTRAVENOUS at 12:10

## 2018-10-25 RX ADMIN — DOCUSATE SODIUM 100 MG: 100 CAPSULE, LIQUID FILLED ORAL at 09:10

## 2018-10-25 RX ADMIN — LIDOCAINE HYDROCHLORIDE 75 MG: 20 INJECTION, SOLUTION INTRAVENOUS at 09:10

## 2018-10-25 RX ADMIN — OXYCODONE HYDROCHLORIDE 10 MG: 10 TABLET ORAL at 02:10

## 2018-10-25 RX ADMIN — POLYETHYLENE GLYCOL 3350 17 G: 17 POWDER, FOR SOLUTION ORAL at 02:10

## 2018-10-25 RX ADMIN — OXYCODONE HYDROCHLORIDE 15 MG: 10 TABLET ORAL at 07:10

## 2018-10-25 RX ADMIN — ASPIRIN 81 MG: 81 TABLET, COATED ORAL at 01:10

## 2018-10-25 RX ADMIN — FENTANYL CITRATE 50 MCG: 50 INJECTION, SOLUTION INTRAMUSCULAR; INTRAVENOUS at 09:10

## 2018-10-25 RX ADMIN — PHENYLEPHRINE HYDROCHLORIDE 100 MCG: 10 INJECTION INTRAVENOUS at 11:10

## 2018-10-25 RX ADMIN — CEFAZOLIN 2 G: 330 INJECTION, POWDER, FOR SOLUTION INTRAMUSCULAR; INTRAVENOUS at 09:10

## 2018-10-25 RX ADMIN — SODIUM CHLORIDE, SODIUM GLUCONATE, SODIUM ACETATE, POTASSIUM CHLORIDE, MAGNESIUM CHLORIDE, SODIUM PHOSPHATE, DIBASIC, AND POTASSIUM PHOSPHATE: .53; .5; .37; .037; .03; .012; .00082 INJECTION, SOLUTION INTRAVENOUS at 11:10

## 2018-10-25 RX ADMIN — METHOCARBAMOL TABLETS 1500 MG: 500 TABLET, COATED ORAL at 06:10

## 2018-10-25 RX ADMIN — GLYCOPYRROLATE 0.4 MG: 0.2 INJECTION, SOLUTION INTRAMUSCULAR; INTRAVENOUS at 11:10

## 2018-10-25 RX ADMIN — ONDANSETRON 4 MG: 2 INJECTION, SOLUTION INTRAMUSCULAR; INTRAVENOUS at 11:10

## 2018-10-25 RX ADMIN — DOCUSATE SODIUM 100 MG: 100 CAPSULE, LIQUID FILLED ORAL at 02:10

## 2018-10-25 RX ADMIN — ACETAMINOPHEN 1000 MG: 10 INJECTION, SOLUTION INTRAVENOUS at 09:10

## 2018-10-25 RX ADMIN — FENTANYL CITRATE 50 MCG: 50 INJECTION, SOLUTION INTRAMUSCULAR; INTRAVENOUS at 10:10

## 2018-10-25 RX ADMIN — FENTANYL CITRATE 50 MCG: 50 INJECTION, SOLUTION INTRAMUSCULAR; INTRAVENOUS at 12:10

## 2018-10-25 RX ADMIN — CEFAZOLIN 2 G: 1 INJECTION, POWDER, FOR SOLUTION INTRAMUSCULAR; INTRAVENOUS at 06:10

## 2018-10-25 RX ADMIN — METHOCARBAMOL TABLETS 1500 MG: 500 TABLET, COATED ORAL at 09:10

## 2018-10-25 RX ADMIN — ASPIRIN 81 MG: 81 TABLET, COATED ORAL at 09:10

## 2018-10-25 RX ADMIN — KETAMINE HYDROCHLORIDE 20 MG: 10 INJECTION, SOLUTION INTRAMUSCULAR; INTRAVENOUS at 09:10

## 2018-10-25 RX ADMIN — METHOCARBAMOL TABLETS 1500 MG: 500 TABLET, COATED ORAL at 01:10

## 2018-10-25 RX ADMIN — ROCURONIUM BROMIDE 10 MG: 10 INJECTION, SOLUTION INTRAVENOUS at 10:10

## 2018-10-25 RX ADMIN — NEOSTIGMINE METHYLSULFATE 3.5 MG: 1 INJECTION INTRAVENOUS at 11:10

## 2018-10-25 RX ADMIN — OXYCODONE HYDROCHLORIDE 10 MG: 10 TABLET ORAL at 06:10

## 2018-10-25 RX ADMIN — PROPOFOL 150 MG: 10 INJECTION, EMULSION INTRAVENOUS at 09:10

## 2018-10-25 RX ADMIN — SODIUM CHLORIDE: 0.9 INJECTION, SOLUTION INTRAVENOUS at 01:10

## 2018-10-25 RX ADMIN — ROCURONIUM BROMIDE 50 MG: 10 INJECTION, SOLUTION INTRAVENOUS at 09:10

## 2018-10-25 RX ADMIN — MIDAZOLAM HYDROCHLORIDE 2 MG: 1 INJECTION, SOLUTION INTRAMUSCULAR; INTRAVENOUS at 09:10

## 2018-10-25 RX ADMIN — PREGABALIN 150 MG: 50 CAPSULE ORAL at 09:10

## 2018-10-25 RX ADMIN — DEXAMETHASONE SODIUM PHOSPHATE 8 MG: 4 INJECTION, SOLUTION INTRAMUSCULAR; INTRAVENOUS at 10:10

## 2018-10-25 RX ADMIN — OXYCODONE HYDROCHLORIDE 15 MG: 10 TABLET ORAL at 01:10

## 2018-10-25 RX ADMIN — PHENYLEPHRINE HYDROCHLORIDE 100 MCG: 10 INJECTION INTRAVENOUS at 10:10

## 2018-10-25 NOTE — SUBJECTIVE & OBJECTIVE
"Principal Problem:Closed displaced transverse fracture of shaft of right tibia    Principal Orthopedic Problem: same    Interval History: AFVSS. Pain controlled overnight. RLE iced/elevated. NPO.    Review of patient's allergies indicates:  No Known Allergies    Current Facility-Administered Medications   Medication    0.9%  NaCl infusion    acetaminophen tablet 650 mg    morphine injection 3 mg    ondansetron disintegrating tablet 8 mg    oxyCODONE immediate release tablet 5 mg    oxyCODONE immediate release tablet Tab 10 mg    promethazine (PHENERGAN) 6.25 mg in dextrose 5 % 50 mL IVPB    ramelteon tablet 8 mg    sodium chloride 0.9% flush 3 mL    sodium chloride 0.9% flush 3 mL     Objective:     Vital Signs (Most Recent):  Temp: 97.8 °F (36.6 °C) (10/25/18 0136)  Pulse: 84 (10/25/18 0136)  Resp: 18 (10/25/18 0136)  BP: (!) 124/90 (10/25/18 0136)  SpO2: 99 % (10/25/18 0136) Vital Signs (24h Range):  Temp:  [97.8 °F (36.6 °C)-98 °F (36.7 °C)] 97.8 °F (36.6 °C)  Pulse:  [68-84] 84  Resp:  [18-20] 18  SpO2:  [94 %-99 %] 99 %  BP: (111-150)/(61-90) 124/90     Weight: 83.9 kg (185 lb)  Height: 5' 10" (177.8 cm)  Body mass index is 26.54 kg/m².    No intake or output data in the 24 hours ending 10/25/18 0612    Ortho/SPM Exam   HEENT: normocephalic, atraumatic  Resp: no increased work of breathing  CV: regular rate and rhythm  MSK: moves b/l upper extremities well     right lower extremity:  Skin intact  Swelling of around the mid/distal thirds of the tibia  Compartments soft and compressible, pt able to tolerate ROM of toes  Sensation intact SP/DP/T/Sural/Saphenous nerves  Motor intact EHL/FHL/TA/gastroc  Palpable DP/PT pulses      Significant Labs:   BMP:   Recent Labs   Lab 10/24/18  1714         K 4.0      CO2 25   BUN 14   CREATININE 1.0   CALCIUM 9.1     CBC:   Recent Labs   Lab 10/24/18  1714   WBC 7.14   HGB 14.0   HCT 43.4        Coagulation:   Recent Labs   Lab " 10/24/18  1828   LABPROT 10.1   INR 1.0       Significant Imaging: I have reviewed all pertinent imaging results/findings.

## 2018-10-25 NOTE — HPI
Emely Albrecht is an otherwise healthy 52 y.o. 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 He felt an immediate snap in his right leg with pain, deformity, and inability to bear weight. He denies numbness or tingling. No other injuries. He did not hit his head or lose consciousness.

## 2018-10-25 NOTE — ASSESSMENT & PLAN NOTE
Emely Albrecht is a 52 y.o. male with closed right tib/fib shaft fracture  - Admit to orthopedics  - Long leg splint  - Ice/elevate overnight  - NPO midnight  - To OR tomorrow for tibial nail

## 2018-10-25 NOTE — SUBJECTIVE & OBJECTIVE
"History reviewed. No pertinent past medical history.    Past Surgical History:   Procedure Laterality Date    EYE SURGERY         Review of patient's allergies indicates:  No Known Allergies    Current Facility-Administered Medications   Medication    0.9%  NaCl infusion    acetaminophen tablet 650 mg    morphine injection 3 mg    ondansetron disintegrating tablet 8 mg    oxyCODONE immediate release tablet 5 mg    oxyCODONE immediate release tablet Tab 10 mg    promethazine (PHENERGAN) 6.25 mg in dextrose 5 % 50 mL IVPB    ramelteon tablet 8 mg    sodium chloride 0.9% flush 3 mL    sodium chloride 0.9% flush 3 mL     Family History     None        Tobacco Use    Smoking status: Not on file   Substance and Sexual Activity    Alcohol use: Not on file    Drug use: Not on file    Sexual activity: Not on file     ROS   Reviewed ROS documented by ED provider same date.    Objective:     Vital Signs (Most Recent):  Temp: 97.8 °F (36.6 °C) (10/25/18 0136)  Pulse: 84 (10/25/18 0136)  Resp: 18 (10/25/18 0136)  BP: (!) 124/90 (10/25/18 0136)  SpO2: 99 % (10/25/18 0136) Vital Signs (24h Range):  Temp:  [97.8 °F (36.6 °C)-98 °F (36.7 °C)] 97.8 °F (36.6 °C)  Pulse:  [68-84] 84  Resp:  [18-20] 18  SpO2:  [94 %-99 %] 99 %  BP: (111-150)/(61-90) 124/90     Weight: 83.9 kg (185 lb)  Height: 5' 10" (177.8 cm)  Body mass index is 26.54 kg/m².    No intake or output data in the 24 hours ending 10/25/18 0156    Ortho/SPM Exam   HEENT: normocephalic, atraumatic  Resp: no increased work of breathing  CV: regular rate and rhythm  MSK: moves b/l upper extremities well    right lower extremity:  Skin intact  Swelling of around the mid/distal thirds of the tibia  Compartments soft and compressible, pt able to tolerate ROM of toes  Sensation intact SP/DP/T/Sural/Saphenous nerves  Motor intact EHL/FHL/TA/gastroc  Palpable DP/PT pulses      Significant Labs:   CBC:   Recent Labs   Lab 10/24/18  1714   WBC 7.14   HGB 14.0   HCT 43.4 "        CMP:   Recent Labs   Lab 10/24/18  1714      K 4.0      CO2 25      BUN 14   CREATININE 1.0   CALCIUM 9.1   PROT 6.9   ALBUMIN 3.9   BILITOT 0.3   ALKPHOS 74   AST 19   ALT 20   ANIONGAP 8   EGFRNONAA >60.0     Coagulation:   Recent Labs   Lab 10/24/18  1828   LABPROT 10.1   INR 1.0     All pertinent labs within the past 24 hours have been reviewed.    Significant Imaging: I have reviewed all pertinent imaging results/findings. Closed transverse fractures of the right tibia/fibula with mild comminution.

## 2018-10-25 NOTE — NURSING TRANSFER
Nursing Transfer Note      10/25/2018     Transfer To: [address]    Transfer via bed    Transfer with IV pole, ice bag    Transported by PCT x2    Medicines sent: bacitracin    Chart send with patient: Yes    Notified: spouse during 1400 visit    Patient reassessed at: 10/25/2018 1430

## 2018-10-25 NOTE — OP NOTE
DATE OF PROCEDURE:  10/25/2018    PREOPERATIVE DIAGNOSIS:  Closed right tibia and fibular shaft fractures.    POSTOPERATIVE DIAGNOSIS:  Closed right tibia and fibular shaft fractures.    PROCEDURE PERFORMED:  Intramedullary nail fixation of right tibial shaft   fracture, 78875.    SURGEON:  Tim Madrid M.D.    ASSISTANT:  Kamari Masterson M.D. (RES)    ANESTHESIA:  General endotracheal.    ESTIMATED BLOOD LOSS:  50 mL.    IV FLUIDS:  1000 mL crystalloid.    IMPLANTS:  Synthes EX tibial nail 345 x 12 mm with two proximal and two distal   interlocking screws.    SPECIMENS:  None.    INDICATIONS FOR PROCEDURE:  The patient is a 52-year-old male who fell from a   height at work resulting in a closed fracture of the right tibia and fibular   shaft.  The patient had significant displacement and is being brought to the   Operating Room for intervention of fixation.  The risks, benefits and   alternatives of surgery were discussed at length with the patient prior to going   to the Operating Room.  Informed consent was obtained.    PROCEDURE IN DETAIL:  The patient was identified in the preoperative holding   area and the site was marked.  The patient was wheeled into the Operating Room   and placed on the operative table in supine position.  General endotracheal   anesthesia was induced and preoperative antibiotics were administered.  Right   lower extremity was prepped and draped in sterile fashion.  A timeout was   undertaken to confirm patient, site, side, surgery, surgeon and administration   of preoperative antibiotics.  All agreed and we proceeded.    We put the leg on a triangle with the knee bent, made an incision overlying the   patellar tendon and cut it through its mid substance.  I placed a guidewire for   the entry reamer in the appropriate position and used the entry reamer to gain   access.  At this point, I placed the awl in that hole, suck it to get it more   anteriorly and placed a reaming gem into the  shaft of the bone and removed the   awl.  We drove the reaming gem distal, keeping it center-center past the   fracture to the distal tibia and then measured and a 345 mm nail was   appropriate.  Sequential reaming was undertaken to 13.5 mm with good chatter and   then a 345 x 12 mm nail was placed over the guide gem and passed distally under   fluoroscopic guidance.  We ensured that it was in the appropriate position and   set our rotation alignment of the tibia.  I then placed two proximal   interlocking screws at the insertion handle, one in static and one in dynamic   position.  We then removed the proximal insertion handle, irrigated the knee   copiously to get the reamings out of the area.  Then, straighten the leg out and   proceed with distal interlocking.    Again, checked the rotation and alignment of the fracture and using perfect   [place] technique, placed two medial to lateral distal interlocking screws.    Entire construct was visualized with good hardware placement and good fracture   reduction.  The fibula was also well aligned here.  At this point, I did an   external rotation stress test of the fibula given the fracture was at the   beginnings of the flare at the end of the tibial diaphysis and the fibular   fracture mass at that level.  The syndesmosis was stable, so I do not think that   we will have to fix the fibula.  Compartments at this point were soft.    All wounds were copiously irrigated with normal saline solution.  The patellar   tendon and peritenon were closed with 0 Vicryl sutures, next layer of fascia   with 0 Vicryl sutures and all the wounds were closed with 3-0 Vicryl suture in   the subcutaneous tissue and 3-0 nylon suture in the skin.  Sterile dressings   were applied.    All instrument and sponge counts were reported correct at the end of the case.    There were no complications.  The patient was extubated, awakened and taken to   the Recovery Room in stable condition.    PLAN  FOR THE PATIENT:  We will allow him to weightbear as tolerated.  We will   give him plantar fasciitis boot to keep him out of equinus at night.      OVIDIO  dd: 10/25/2018 12:29:09 (CDT)  td: 10/25/2018 14:51:21 (CDT)  Doc ID   #[id number]  Job ID #[id number]    CC:

## 2018-10-25 NOTE — PROGRESS NOTES
Admit note:    Report received. Care assumed. Patient arrived via stretcher AAO x4. Pt lying supine in bed. Pt complaining of pain, on call physician notified, and new orders were given. See assessment. Patient oriented to room. Bed in lowest position, side rails up x2, bed wheels locked and call light within reach, NADK. Will continue to monitor.

## 2018-10-25 NOTE — BRIEF OP NOTE
BRIEF OP NOTE    Preop Dx: Closed right tibia and fibular shaft fractures    Postop Dx: Closed right tibia and fibular shaft fractures    Procedure: Intramedullary nail fixation of right tibial shaft fracture    Surgeon: Tim Madrid M.D.    Asst:  Kamari Carmona M.D    Anesthesia: GETA    EBL:  50cc    IVF:  1000cc crystalloid    Implants: Synthes EX tibial nail 345x12 with 2 proximal and 2 distal IL screws  Implant Name Type Inv. Item Serial No.  Lot No. LRB No. Used   NAIL TIB LIANA 12X345 TTNM STER - [id number]  NAIL TIB LIANA 12X345 TTNM STER  [employer] [id number] Right 1   SCREW LOCK T25 5.0X38MM - [id number]  SCREW LOCK T25 5.0X38MM  [employer] [id number] Right 1   SCREW STRDRV REC T25 5X42 TTNM - [id number]  SCREW STRDRV REC T25 5X42 TTNM  [employer] [id number] Right 1   SCREW STRDRV REC T25 5X32 TTNM - [id number]  SCREW STRDRV REC T25 5X32 TTNM  [employer] [id number] Right 1   SCREW LOCK T25 5.0X38MM - [id number]  SCREW LOCK T25 5.0X38MM  [employer] [id number] Right 1   WIRE GUIDE 3.2MM 400MM - [id number]  WIRE GUIDE 3.2MM 400MM  [employer] LOAD#[id number], STERILIZED 10OCT2018 Right 1       Specimens: None    Findings: Stable fixation.  Stable syndemosis    Dispo:  To PACU extubated/stable     Dict#  [id number]

## 2018-10-25 NOTE — ASSESSMENT & PLAN NOTE
Emely Albrecht is a 52 y.o. male with closed right tib/fib fxs  --RLE ice, elevation in long leg splint  --Compartment checks  --To OR today for operative fixation with tibial nail  --Pt marked and consented, case booked  --Pre-operative labs and imaging obtained in ED (UA, Type and Cross, PT-INR, CBC, BMP, PreAlbumin, CXR, EKG)   --Bed rest, NPO    Risks and complications were discussed including but not limited to the risks of anesthetic complications, infection, wound healing complications, anterior knee pain, rotational deformity, non-union, mal-union, hardware failure, pain, stiffness, DVT, pulmonary embolism, perioperative medical risks (cardiac, pulmonary, renal, neurologic), and death among others were discussed. No guarantees were made and the patient and family elect to proceed.

## 2018-10-25 NOTE — CARE UPDATE
Pt sleeping comfortably. RLE elevated in splint. RLE compartments checked. Compartments soft. Pt tolerating ROM of toes with minimal discomfort. Denies paresthesias. Cap refill <3 sec. 2+ DP pulse.    Radha Peña MD  Orthopedic Surgery

## 2018-10-25 NOTE — ANESTHESIA PREPROCEDURE EVALUATION
10/24/2018  Pre-operative evaluation for Procedure(s) (LRB):  INSERTION, INTRAMEDULLARY NOLA, TIBIA-right-orlin table-large triangle-big c arm on patient's left side (Right)    Emely Albrecht is a 52 y.o. male with no PMH who presented to ED after yair t work. 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 XRay shows tib-fib fracture. Patient scheduled for above procedure.     LDA:   - 18G PIV in Left Wrist    Prev airway: None on file    Drips: None    Patient Active Problem List   Diagnosis    Closed displaced transverse fracture of shaft of right tibia wtih fibula       Review of patient's allergies indicates:  No Known Allergies     No current facility-administered medications on file prior to encounter.      No current outpatient medications on file prior to encounter.       Past Surgical History:   Procedure Laterality Date    EYE SURGERY         Social History     Socioeconomic History    Marital status: Single     Spouse name: Not on file    Number of children: Not on file    Years of education: Not on file    Highest education level: Not on file   Social Needs    Financial resource strain: Not on file    Food insecurity - worry: Not on file    Food insecurity - inability: Not on file    Transportation needs - medical: Not on file    Transportation needs - non-medical: Not on file   Occupational History    Not on file   Tobacco Use    Smoking status: Not on file   Substance and Sexual Activity    Alcohol use: Not on file    Drug use: Not on file    Sexual activity: Not on file   Other Topics Concern    Not on file   Social History Narrative    Not on file         Vital Signs Range (Last 24H):  Pulse:  [68-84]   Resp:  [18-20]   BP: (111-150)/(61-85)   SpO2:  [95 %-99 %]       CBC:   Recent Labs     10/24/18  1714    WBC 7.14   RBC 5.11   HGB 14.0   HCT 43.4      MCV 85   MCH 27.4   MCHC 32.3       CMP:   Recent Labs     10/24/18  1714      K 4.0      CO2 25   BUN 14   CREATININE 1.0      CALCIUM 9.1   ALBUMIN 3.9   PROT 6.9   ALKPHOS 74   ALT 20   AST 19   BILITOT 0.3       INR  Recent Labs     10/24/18  1828   INR 1.0       EKG: pending      2D Echo: None          Anesthesia Evaluation    I have reviewed the Patient Summary Reports.    I have reviewed the Nursing Notes.      Review of Systems  Anesthesia Hx:  No problems with previous Anesthesia  History of prior surgery of interest to airway management or planning: Denies Family Hx of Anesthesia complications.   Denies Personal Hx of Anesthesia complications.   Hematology/Oncology:  Hematology Normal   Oncology Normal     EENT/Dental:EENT/Dental Normal   Cardiovascular:  Cardiovascular Normal     Pulmonary:  Pulmonary Normal    Renal/:  Renal/ Normal     Hepatic/GI:  Hepatic/GI Normal    Musculoskeletal:   Tib-fib fx   Neurological:  Neurology Normal    Endocrine:  Endocrine Normal    Psych:  Psychiatric Normal           Physical Exam  General:  Well nourished    Airway/Jaw/Neck:  Airway Findings: Mouth Opening: Normal Tongue: Normal  General Airway Assessment: Adult  Mallampati: III  Improves to II with phonation.        Eyes/Ears/Nose:  EYES/EARS/NOSE FINDINGS: Normal   Dental:  DENTAL FINDINGS: Normal   Chest/Lungs:  Chest/Lungs Clear    Heart/Vascular:  Heart Findings: Normal Heart murmur: negative       Mental Status:  Mental Status Findings: Normal        Anesthesia Plan  Type of Anesthesia, risks & benefits discussed:  Anesthesia Type:  general, MAC, regional  Patient's Preference:   Intra-op Monitoring Plan: standard ASA monitors  Intra-op Monitoring Plan Comments:   Post Op Pain Control Plan: multimodal analgesia  Post Op Pain Control Plan Comments:   Induction:    Beta Blocker:  Patient is not currently on a Beta-Blocker (No further  documentation required).       Informed Consent: Patient understands risks and agrees with Anesthesia plan.  Questions answered. Anesthesia consent signed with patient.  ASA Score: 1     Day of Surgery Review of History & Physical: I have interviewed and examined the patient. I have reviewed the patient's H&P dated:  There are no significant changes.          Ready For Surgery From Anesthesia Perspective.

## 2018-10-25 NOTE — PLAN OF CARE
Problem: Patient Care Overview  Goal: Plan of Care Review  Outcome: Ongoing (interventions implemented as appropriate)  Pt AAOx4. VS as charted. Q2 rounding for pt care and safety. Pain controlled with PRN medication. No falls/injury reported this shift. No reports of NV. SCD in place. Cast intact. NPO status maintained. RLE elevated with ice. Safety precautions maintained - bed in low position, call light in reach, side rails up x2. Will continue to monitor.

## 2018-10-25 NOTE — H&P
[facility]  Orthopedics  H&P    Patient Name: Emely Albrecht  MRN: [id number]  Admission Date: 10/24/2018  Primary Care Provider: [facility]    Patient information was obtained from patient, spouse/SO and ER records.     Subjective:     Principal Problem:Closed displaced transverse fracture of shaft of right tibia    Chief Complaint:   Chief Complaint   Patient presents with    Leg Injury     Pt slipped while climbing on a piece of equipment and twisted his right lower leg.         HPI: Emely Albrecht is an otherwise healthy 52 y.o. 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 He felt an immediate snap in his right leg with pain, deformity, and inability to bear weight. He denies numbness or tingling. No other injuries. He did not hit his head or lose consciousness.     History reviewed. No pertinent past medical history.    Past Surgical History:   Procedure Laterality Date    EYE SURGERY         Review of patient's allergies indicates:  No Known Allergies    Current Facility-Administered Medications   Medication    0.9%  NaCl infusion    acetaminophen tablet 650 mg    morphine injection 3 mg    ondansetron disintegrating tablet 8 mg    oxyCODONE immediate release tablet 5 mg    oxyCODONE immediate release tablet Tab 10 mg    promethazine (PHENERGAN) 6.25 mg in dextrose 5 % 50 mL IVPB    ramelteon tablet 8 mg    sodium chloride 0.9% flush 3 mL    sodium chloride 0.9% flush 3 mL     Family History     None        Tobacco Use    Smoking status: Not on file   Substance and Sexual Activity    Alcohol use: Not on file    Drug use: Not on file    Sexual activity: Not on file     ROS   Reviewed ROS documented by ED provider same date.    Objective:     Vital Signs (Most Recent):  Temp: 97.8 °F (36.6 °C) (10/25/18 0136)  Pulse: 84 (10/25/18 0136)  Resp: 18 (10/25/18 0136)  BP: (!) 124/90  "(10/25/18 0136)  SpO2: 99 % (10/25/18 0136) Vital Signs (24h Range):  Temp:  [97.8 °F (36.6 °C)-98 °F (36.7 °C)] 97.8 °F (36.6 °C)  Pulse:  [68-84] 84  Resp:  [18-20] 18  SpO2:  [94 %-99 %] 99 %  BP: (111-150)/(61-90) 124/90     Weight: 83.9 kg (185 lb)  Height: 5' 10" (177.8 cm)  Body mass index is 26.54 kg/m².    No intake or output data in the 24 hours ending 10/25/18 0156    Ortho/SPM Exam   HEENT: normocephalic, atraumatic  Resp: no increased work of breathing  CV: regular rate and rhythm  MSK: moves b/l upper extremities well    right lower extremity:  Skin intact  Swelling of around the mid/distal thirds of the tibia  Compartments soft and compressible, pt able to tolerate ROM of toes  Sensation intact SP/DP/T/Sural/Saphenous nerves  Motor intact EHL/FHL/TA/gastroc  Palpable DP/PT pulses      Significant Labs:   CBC:   Recent Labs   Lab 10/24/18  1714   WBC 7.14   HGB 14.0   HCT 43.4        CMP:   Recent Labs   Lab 10/24/18  1714      K 4.0      CO2 25      BUN 14   CREATININE 1.0   CALCIUM 9.1   PROT 6.9   ALBUMIN 3.9   BILITOT 0.3   ALKPHOS 74   AST 19   ALT 20   ANIONGAP 8   EGFRNONAA >60.0     Coagulation:   Recent Labs   Lab 10/24/18  1828   LABPROT 10.1   INR 1.0     All pertinent labs within the past 24 hours have been reviewed.    Significant Imaging: I have reviewed all pertinent imaging results/findings. Closed transverse fractures of the right tibia/fibula with mild comminution.    Assessment/Plan:     * Closed displaced transverse fracture of shaft of right tibia wtih fibula    Emely Albrecht is a 52 y.o. male with closed right tib/fib fxs  --Admit to orthopedics  --RLE ice, elevation in long leg splint  --Compartment checks  --To OR today for operative fixation with tibial nail  --Pt marked and consented, case booked  --Pre-operative labs and imaging obtained in ED (UA, Type and Cross, PT-INR, CBC, BMP, PreAlbumin, CXR, EKG)   --Bed rest, NPO    Risks and " complications were discussed including but not limited to the risks of anesthetic complications, infection, wound healing complications, anterior knee pain, rotational deformity, non-union, mal-union, hardware failure, pain, stiffness, DVT, pulmonary embolism, perioperative medical risks (cardiac, pulmonary, renal, neurologic), and death among others were discussed. No guarantees were made and the patient and family elect to proceed.           Radha Peña MD  Orthopedics  [facility]

## 2018-10-25 NOTE — PROGRESS NOTES
"[facility]  Orthopedics  Progress Note    Patient Name: Emely Albrecht  MRN: [id number]  Admission Date: 10/24/2018  Hospital Length of Stay: 0 days  Attending Provider: Tim Madrid MD  Primary Care Provider: [facility]  Follow-up For: Procedure(s) (LRB):  INSERTION, INTRAMEDULLARY NOLA, TIBIA-right-orlin table-large triangle-big c arm on patient's left side (Right)    Post-Operative Day:    Subjective:     Principal Problem:Closed displaced transverse fracture of shaft of right tibia    Principal Orthopedic Problem: same    Interval History: AFVSS. Pain controlled overnight. RLE iced/elevated. NPO.    Review of patient's allergies indicates:  No Known Allergies    Current Facility-Administered Medications   Medication    0.9%  NaCl infusion    acetaminophen tablet 650 mg    morphine injection 3 mg    ondansetron disintegrating tablet 8 mg    oxyCODONE immediate release tablet 5 mg    oxyCODONE immediate release tablet Tab 10 mg    promethazine (PHENERGAN) 6.25 mg in dextrose 5 % 50 mL IVPB    ramelteon tablet 8 mg    sodium chloride 0.9% flush 3 mL    sodium chloride 0.9% flush 3 mL     Objective:     Vital Signs (Most Recent):  Temp: 97.8 °F (36.6 °C) (10/25/18 0136)  Pulse: 84 (10/25/18 0136)  Resp: 18 (10/25/18 0136)  BP: (!) 124/90 (10/25/18 0136)  SpO2: 99 % (10/25/18 0136) Vital Signs (24h Range):  Temp:  [97.8 °F (36.6 °C)-98 °F (36.7 °C)] 97.8 °F (36.6 °C)  Pulse:  [68-84] 84  Resp:  [18-20] 18  SpO2:  [94 %-99 %] 99 %  BP: (111-150)/(61-90) 124/90     Weight: 83.9 kg (185 lb)  Height: 5' 10" (177.8 cm)  Body mass index is 26.54 kg/m².    No intake or output data in the 24 hours ending 10/25/18 0612    Ortho/SPM Exam   HEENT: normocephalic, atraumatic  Resp: no increased work of breathing  CV: regular rate and rhythm  MSK: moves b/l upper extremities well     right lower extremity:  Skin intact  Swelling of around the mid/distal thirds of the " tibia  Compartments soft and compressible, pt able to tolerate ROM of toes  Sensation intact SP/DP/T/Sural/Saphenous nerves  Motor intact EHL/FHL/TA/gastroc  Palpable DP/PT pulses      Significant Labs:   BMP:   Recent Labs   Lab 10/24/18  1714         K 4.0      CO2 25   BUN 14   CREATININE 1.0   CALCIUM 9.1     CBC:   Recent Labs   Lab 10/24/18  1714   WBC 7.14   HGB 14.0   HCT 43.4        Coagulation:   Recent Labs   Lab 10/24/18  1828   LABPROT 10.1   INR 1.0       Significant Imaging: I have reviewed all pertinent imaging results/findings.    Assessment/Plan:     * Closed displaced transverse fracture of shaft of right tibia wtih fibula    Emely Albrecht is a 52 y.o. male with closed right tib/fib fxs  --RLE ice, elevation in long leg splint  --Compartment checks  --To OR today for operative fixation with tibial nail  --Pt marked and consented, case booked  --Pre-operative labs and imaging obtained in ED (UA, Type and Cross, PT-INR, CBC, BMP, PreAlbumin, CXR, EKG)   --Bed rest, NPO    Risks and complications were discussed including but not limited to the risks of anesthetic complications, infection, wound healing complications, anterior knee pain, rotational deformity, non-union, mal-union, hardware failure, pain, stiffness, DVT, pulmonary embolism, perioperative medical risks (cardiac, pulmonary, renal, neurologic), and death among others were discussed. No guarantees were made and the patient and family elect to proceed.             Radha Peña MD  Orthopedics  [facility]

## 2018-10-25 NOTE — SUBJECTIVE & OBJECTIVE
History reviewed. No pertinent past medical history.    Past Surgical History:   Procedure Laterality Date    EYE SURGERY         Review of patient's allergies indicates:  No Known Allergies    No current facility-administered medications on file prior to encounter.      No current outpatient medications on file prior to encounter.     Family History     None        Tobacco Use    Smoking status: Not on file   Substance and Sexual Activity    Alcohol use: Not on file    Drug use: Not on file    Sexual activity: Not on file     Review of Systems  Reviewed ROS documented by ED provider same date    Objective:     Vital Signs (Most Recent):  Pulse: 68 (10/24/18 2019)  Resp: 20 (10/24/18 2019)  BP: 111/61 (10/24/18 2019)  SpO2: 95 % (10/24/18 2019) Vital Signs (24h Range):  Pulse:  [68-84] 68  Resp:  [18-20] 20  SpO2:  [95 %-99 %] 95 %  BP: (111-150)/(61-85) 111/61     Weight: 83.9 kg (185 lb)  Body mass index is 26.54 kg/m².    Physical Exam  HEENT: normocephalic, atraumatic  Resp: no increased work of breathing  CV: regular rate and rhythm  MSK: moves b/l upper extremities well    right lower extremity:  Skin intact  Swelling around midshaft of tibia  Compartments soft and compressible, able to tolerate gentle ROM of toes  No ecchymoses  Sensation intact SP/DP/T/Sural/Saphenous nerves  Motor intact EHL/FHL/TA/gastroc  Palpable DP/PT pulses      Significant Labs:   CBC:   Recent Labs   Lab 10/24/18  1714   WBC 7.14   HGB 14.0   HCT 43.4        CMP:   Recent Labs   Lab 10/24/18  1714      K 4.0      CO2 25      BUN 14   CREATININE 1.0   CALCIUM 9.1   PROT 6.9   ALBUMIN 3.9   BILITOT 0.3   ALKPHOS 74   AST 19   ALT 20   ANIONGAP 8   EGFRNONAA >60.0       Significant Imaging: I have reviewed all pertinent imaging results/findings within the past 24 hours. Midshaft transverse, comminuted fractures of the tibia and fibula

## 2018-10-25 NOTE — TRANSFER OF CARE
"Anesthesia Transfer of Care Note    Patient: Emely Albrecht    Procedure(s) Performed: Procedure(s) (LRB):  INSERTION, INTRAMEDULLARY NOLA, TIBIA-right-orlin table-large triangle-big c arm on patient's left side (Right)    Patient location: PACU    Anesthesia Type: general    Transport from OR: Transported from OR on room air with adequate spontaneous ventilation    Post pain: adequate analgesia    Post assessment: no apparent anesthetic complications and tolerated procedure well    Post vital signs: stable    Level of consciousness: awake and responds to stimulation    Nausea/Vomiting: no nausea/vomiting    Complications: none    Transfer of care protocol was followed      Last vitals:   Visit Vitals  /69 (BP Location: Right arm, Patient Position: Lying)   Pulse 63   Temp 36.2 °C (97.2 °F) (Temporal)   Resp 16   Ht 5' 10" (1.778 m)   Wt 83.9 kg (185 lb)   SpO2 97%   BMI 26.54 kg/m²     "

## 2018-10-25 NOTE — ASSESSMENT & PLAN NOTE
Emely Albrecht is a 52 y.o. male with closed right tib/fib fxs  --Admit to orthopedics  --RLE ice, elevation in long leg splint  --Compartment checks  --To OR today for operative fixation with tibial nail  --Pt marked and consented, case booked  --Pre-operative labs and imaging obtained in ED (UA, Type and Cross, PT-INR, CBC, BMP, PreAlbumin, CXR, EKG)   --Bed rest, NPO    Risks and complications were discussed including but not limited to the risks of anesthetic complications, infection, wound healing complications, anterior knee pain, rotational deformity, non-union, mal-union, hardware failure, pain, stiffness, DVT, pulmonary embolism, perioperative medical risks (cardiac, pulmonary, renal, neurologic), and death among others were discussed. No guarantees were made and the patient and family elect to proceed.

## 2018-10-25 NOTE — ED NOTES
Pt resting comfortably and independently repositioned in stretcher with bed locked in lowest position for safety. NAD noted at this time. Respirations even and unlabored and visible chest rise noted.  Patient offered bathroom assistance and denies need at this time. Pt instructed to call if assistance is needed. Pt on continuous cardiac, BP, and O2 monitoring. Call light within reach. Family at bedside. No needs at this time. Will continue to monitor.     Right foot dressing -- clean dry and intact

## 2018-10-25 NOTE — HPI
Emely Albrecht is a 52 y.o. male who presents with right leg pain after a fall ~4' off an excavator earlier today. He had immediate pain, deformity, and inability to bear weight. He says he landed on his leg awkwardly. He has no other injuries. Did not hit his head or lose consciousness. He is otherwise healthy, does not take any medication regularly.

## 2018-10-26 VITALS
BODY MASS INDEX: 26.48 KG/M2 | TEMPERATURE: 98 F | DIASTOLIC BLOOD PRESSURE: 53 MMHG | WEIGHT: 185 LBS | OXYGEN SATURATION: 96 % | RESPIRATION RATE: 18 BRPM | SYSTOLIC BLOOD PRESSURE: 102 MMHG | HEIGHT: 70 IN | HEART RATE: 81 BPM

## 2018-10-26 PROCEDURE — 63600175 PHARM REV CODE 636 W HCPCS: Performed by: STUDENT IN AN ORGANIZED HEALTH CARE EDUCATION/TRAINING PROGRAM

## 2018-10-26 PROCEDURE — G8979 MOBILITY GOAL STATUS: HCPCS | Mod: CI

## 2018-10-26 PROCEDURE — G8988 SELF CARE GOAL STATUS: HCPCS | Mod: CI

## 2018-10-26 PROCEDURE — 97165 OT EVAL LOW COMPLEX 30 MIN: CPT

## 2018-10-26 PROCEDURE — 97161 PT EVAL LOW COMPLEX 20 MIN: CPT

## 2018-10-26 PROCEDURE — 25000003 PHARM REV CODE 250: Performed by: STUDENT IN AN ORGANIZED HEALTH CARE EDUCATION/TRAINING PROGRAM

## 2018-10-26 PROCEDURE — 97535 SELF CARE MNGMENT TRAINING: CPT

## 2018-10-26 PROCEDURE — 25000003 PHARM REV CODE 250: Performed by: ORTHOPAEDIC SURGERY

## 2018-10-26 PROCEDURE — 97116 GAIT TRAINING THERAPY: CPT

## 2018-10-26 PROCEDURE — G8978 MOBILITY CURRENT STATUS: HCPCS | Mod: CI

## 2018-10-26 PROCEDURE — G0378 HOSPITAL OBSERVATION PER HR: HCPCS

## 2018-10-26 PROCEDURE — G8980 MOBILITY D/C STATUS: HCPCS | Mod: CI

## 2018-10-26 PROCEDURE — G8987 SELF CARE CURRENT STATUS: HCPCS | Mod: CI

## 2018-10-26 PROCEDURE — G8989 SELF CARE D/C STATUS: HCPCS | Mod: CI

## 2018-10-26 RX ADMIN — ACETAMINOPHEN 1000 MG: 10 INJECTION, SOLUTION INTRAVENOUS at 05:10

## 2018-10-26 RX ADMIN — SODIUM CHLORIDE: 0.9 INJECTION, SOLUTION INTRAVENOUS at 06:10

## 2018-10-26 RX ADMIN — OXYCODONE HYDROCHLORIDE 15 MG: 10 TABLET ORAL at 06:10

## 2018-10-26 RX ADMIN — ASPIRIN 81 MG: 81 TABLET, COATED ORAL at 08:10

## 2018-10-26 RX ADMIN — POLYETHYLENE GLYCOL 3350 17 G: 17 POWDER, FOR SOLUTION ORAL at 08:10

## 2018-10-26 RX ADMIN — OXYCODONE HYDROCHLORIDE 15 MG: 10 TABLET ORAL at 11:10

## 2018-10-26 RX ADMIN — METHOCARBAMOL TABLETS 1500 MG: 500 TABLET, COATED ORAL at 08:10

## 2018-10-26 RX ADMIN — RAMELTEON 8 MG: 8 TABLET, FILM COATED ORAL at 12:10

## 2018-10-26 RX ADMIN — METHOCARBAMOL TABLETS 1500 MG: 500 TABLET, COATED ORAL at 12:10

## 2018-10-26 RX ADMIN — CEFAZOLIN 2 G: 1 INJECTION, POWDER, FOR SOLUTION INTRAMUSCULAR; INTRAVENOUS at 01:10

## 2018-10-26 RX ADMIN — SODIUM CHLORIDE: 0.9 INJECTION, SOLUTION INTRAVENOUS at 12:10

## 2018-10-26 RX ADMIN — DOCUSATE SODIUM 100 MG: 100 CAPSULE, LIQUID FILLED ORAL at 08:10

## 2018-10-26 RX ADMIN — CEFAZOLIN 2 G: 1 INJECTION, POWDER, FOR SOLUTION INTRAMUSCULAR; INTRAVENOUS at 11:10

## 2018-10-26 NOTE — PT/OT/SLP DISCHARGE
Physical Therapy Discharge Summary    Name: Emely Albrecht  MRN: [id number]   Principal Problem: Closed displaced transverse fracture of shaft of right tibia     Patient Discharged from acute Physical Therapy on 10/26/18.  Please refer to prior PT noted date on 10/26/18 for functional status.     Assessment:     Patient has met all goals and is not appropriate for therapy.    Objective:     GOALS:   Multidisciplinary Problems     Physical Therapy Goals     Not on file          Multidisciplinary Problems (Resolved)        Problem: Physical Therapy Goal    Goal Priority Disciplines Outcome Goal Variances Interventions   Physical Therapy Goal   (Resolved)     PT, PT/OT Outcome(s) achieved                     Reasons for Discontinuation of Therapy Services  Satisfactory goal achievement.      Plan:     Patient Discharged to: Home with Home Health Service.    Frandy Veronica, PT  10/26/2018

## 2018-10-26 NOTE — PLAN OF CARE
Problem: Occupational Therapy Goal  Goal: Occupational Therapy Goal  Outcome: Outcome(s) achieved Date Met: 10/26/18  OT eval complete. Pt tolerated session well. Pt performed all self-care and functional mobility tasks safely w/ supervision - Mod I presenting just below his PLOF. He does not require acute OT services at this time. He will benefit from further therapy w/ outpatient PT once d/c'ed from hospital.    Comments: D/c from acute OT

## 2018-10-26 NOTE — PROGRESS NOTES
[facility]  Orthopedics  Progress Note    Patient Name: Emely Albrecht  MRN: [id number]  Admission Date: 10/24/2018  Hospital Length of Stay: 0 days  Attending Provider: Tim Madrid MD  Primary Care Provider: [facility]  Follow-up For: Procedure(s) (LRB):  INSERTION, INTRAMEDULLARY NOLA, TIBIA-right-orlin table-large triangle-big c arm on patient's left side (Right)    Post-Operative Day: 1 Day Post-Op  Subjective:     Principal Problem:Closed displaced transverse fracture of shaft of right tibia    Principal Orthopedic Problem: same    Interval History: Patient seen and examined at bedside.  No acute events overnight.  Pain controlled.  No PT yesterday.      Review of patient's allergies indicates:  No Known Allergies    Current Facility-Administered Medications   Medication    0.9%  NaCl infusion    acetaminophen tablet 1,000 mg    aspirin EC tablet 81 mg    bisacodyl suppository 10 mg    ceFAZolin injection 2 g    docusate sodium capsule 100 mg    HYDROmorphone injection 0.5 mg    [START ON 10/28/2018] methocarbamol tablet 1,000 mg    methocarbamol tablet 1,500 mg    ondansetron disintegrating tablet 8 mg    oxyCODONE immediate release tablet 10 mg    oxyCODONE immediate release tablet 15 mg    oxyCODONE immediate release tablet 5 mg    polyethylene glycol packet 17 g    pregabalin capsule 150 mg    promethazine (PHENERGAN) 6.25 mg in dextrose 5 % 50 mL IVPB    ramelteon tablet 8 mg    sodium chloride 0.9% flush 3 mL    sodium chloride 0.9% flush 3 mL    sodium chloride 0.9% flush 3 mL     Objective:     Vital Signs (Most Recent):  Temp: 98.3 °F (36.8 °C) (10/26/18 0458)  Pulse: 87 (10/26/18 0458)  Resp: 18 (10/26/18 0458)  BP: (!) 99/54 (10/26/18 0458)  SpO2: 96 % (10/26/18 0458) Vital Signs (24h Range):  Temp:  [95.9 °F (35.5 °C)-99.5 °F (37.5 °C)] 98.3 °F (36.8 °C)  Pulse:  [63-98] 87  Resp:  [12-20] 18  SpO2:  [92 %-100 %] 96 %  BP:  "()/(51-75) 99/54     Weight: 83.9 kg (185 lb)  Height: 5' 10" (177.8 cm)  Body mass index is 26.54 kg/m².      Intake/Output Summary (Last 24 hours) at 10/26/2018 0602  Last data filed at 10/25/2018 1800  Gross per 24 hour   Intake 2756.42 ml   Output 0 ml   Net 2756.42 ml       Ortho/SPM Exam  AAOx4  NAD  RRR  No increased WOB  drsg c/d/i  ice in place  SILT and motor intact T/SP/DP  Compartments soft and compressible  WWP extremities  FCDs in place and functioning    Significant Labs:   CBC:   Recent Labs   Lab 10/24/18  1714 10/25/18  1235   WBC 7.14 10.29   HGB 14.0 13.6*   HCT 43.4 42.5    200     CMP:   Recent Labs   Lab 10/24/18  1714 10/25/18  1235    136   K 4.0 4.2    107   CO2 25 24    145*   BUN 14 14   CREATININE 1.0 0.9   CALCIUM 9.1 8.6*   PROT 6.9  --    ALBUMIN 3.9  --    BILITOT 0.3  --    ALKPHOS 74  --    AST 19  --    ALT 20  --    ANIONGAP 8 5*   EGFRNONAA >60.0 >60.0     All pertinent labs within the past 24 hours have been reviewed.    Significant Imaging: I have reviewed all pertinent imaging results/findings.    Assessment/Plan:     * Closed displaced transverse fracture of shaft of right tibia wtih fibula    52 y.o. male POD1 s/p R tibia IMN    Pain control  PT/OT: WBAT; plantar fasciitis boot when not ambulating  DVT PPx: ASA 81 BID, FCDs at all times when not ambulating  Abx: postop Ancef  Labs: Hb 13.6  Drain: none  Brasher: none    Dispo: f/u PT recs; likely home today             Kamari Masterson MD  Orthopedics  [facility]  "

## 2018-10-26 NOTE — SUBJECTIVE & OBJECTIVE
"Principal Problem:Closed displaced transverse fracture of shaft of right tibia    Principal Orthopedic Problem: same    Interval History: Patient seen and examined at bedside.  No acute events overnight.  Pain controlled.  No PT yesterday.      Review of patient's allergies indicates:  No Known Allergies    Current Facility-Administered Medications   Medication    0.9%  NaCl infusion    acetaminophen tablet 1,000 mg    aspirin EC tablet 81 mg    bisacodyl suppository 10 mg    ceFAZolin injection 2 g    docusate sodium capsule 100 mg    HYDROmorphone injection 0.5 mg    [START ON 10/28/2018] methocarbamol tablet 1,000 mg    methocarbamol tablet 1,500 mg    ondansetron disintegrating tablet 8 mg    oxyCODONE immediate release tablet 10 mg    oxyCODONE immediate release tablet 15 mg    oxyCODONE immediate release tablet 5 mg    polyethylene glycol packet 17 g    pregabalin capsule 150 mg    promethazine (PHENERGAN) 6.25 mg in dextrose 5 % 50 mL IVPB    ramelteon tablet 8 mg    sodium chloride 0.9% flush 3 mL    sodium chloride 0.9% flush 3 mL    sodium chloride 0.9% flush 3 mL     Objective:     Vital Signs (Most Recent):  Temp: 98.3 °F (36.8 °C) (10/26/18 0458)  Pulse: 87 (10/26/18 0458)  Resp: 18 (10/26/18 0458)  BP: (!) 99/54 (10/26/18 0458)  SpO2: 96 % (10/26/18 0458) Vital Signs (24h Range):  Temp:  [95.9 °F (35.5 °C)-99.5 °F (37.5 °C)] 98.3 °F (36.8 °C)  Pulse:  [63-98] 87  Resp:  [12-20] 18  SpO2:  [92 %-100 %] 96 %  BP: ()/(51-75) 99/54     Weight: 83.9 kg (185 lb)  Height: 5' 10" (177.8 cm)  Body mass index is 26.54 kg/m².      Intake/Output Summary (Last 24 hours) at 10/26/2018 0602  Last data filed at 10/25/2018 1800  Gross per 24 hour   Intake 2756.42 ml   Output 0 ml   Net 2756.42 ml       Ortho/SPM Exam  AAOx4  NAD  RRR  No increased WOB  drsg c/d/i  ice in place  SILT and motor intact T/SP/DP  Compartments soft and compressible  WWP extremities  FCDs in place and " functioning    Significant Labs:   CBC:   Recent Labs   Lab 10/24/18  1714 10/25/18  1235   WBC 7.14 10.29   HGB 14.0 13.6*   HCT 43.4 42.5    200     CMP:   Recent Labs   Lab 10/24/18  1714 10/25/18  1235    136   K 4.0 4.2    107   CO2 25 24    145*   BUN 14 14   CREATININE 1.0 0.9   CALCIUM 9.1 8.6*   PROT 6.9  --    ALBUMIN 3.9  --    BILITOT 0.3  --    ALKPHOS 74  --    AST 19  --    ALT 20  --    ANIONGAP 8 5*   EGFRNONAA >60.0 >60.0     All pertinent labs within the past 24 hours have been reviewed.    Significant Imaging: I have reviewed all pertinent imaging results/findings.

## 2018-10-26 NOTE — PT/OT/SLP EVAL
Physical Therapy Evaluation    Patient Name:  Emely Albrecht   MRN:  [id number]    Recommendations:     Discharge Recommendations:  outpatient PT   Discharge Equipment Recommendations: bedside commode, crutches, axillary   Barriers to discharge: None    Assessment:     Emely Albrecht is a 52 y.o. male admitted with a medical diagnosis of Closed displaced transverse fracture of shaft of right tibia.  He presents with the following impairments/functional limitations:  weakness, impaired endurance, impaired functional mobilty, decreased lower extremity function.    -PT eval complete. Pt tolerated session well today with no complications. Pt able to perform ambulation and stair training with axillary crutches with mild instability which improved as pt continued to walk a farther distance. Pt verbalized understanding of car t/f technique and performing HEP. Pt safe to d/c home with OPPT, axillary crutches, and bedside commode.    Rehab Prognosis:  Good; patient would benefit from acute skilled PT services to address these deficits and reach maximum level of function.      Recent Surgery: Procedure(s) (LRB):  INSERTION, INTRAMEDULLARY NOLA, TIBIA-right-orlin table-large triangle-big c arm on patient's left side (Right) 1 Day Post-Op    Plan:     During this hospitalization, patient to be seen   to address the above listed problems via gait training, therapeutic activities, therapeutic exercises, neuromuscular re-education  · Plan of Care Expires:  11/26/18   Plan of Care Reviewed with: patient, spouse    Subjective     Communicated with nsg prior to session.  Patient found supine upon PT entry to room, agreeable to evaluation.      Chief Complaint: impaired mobility  Patient comments/goals: return home to [facility]  Pain/Comfort:  · Pain Rating 1: 3/10  · Location - Side 1: Right  · Location - Orientation 1: anterior  · Location 1: knee  · Pain Addressed 1: Pre-medicate for activity, Cessation of Activity,  Reposition  · Pain Rating Post-Intervention 1: 3/10    Patients cultural, spiritual, Christianity conflicts given the current situation: none stated    Living Environment:  -Pt lives with his wife in a SSH and 4 PARUL with no rails. Pt used no DME PTA. Pt will have 24/7 (A) from family upon d/c.  Prior to admission, patients level of function was (I).  Patient has the following equipment: none.  DME owned (not currently used): .  Upon discharge, patient will have assistance from family.    Objective:     Patient found with: peripheral IV     General Precautions: Standard, fall   Orthopedic Precautions:RLE weight bearing as tolerated   Braces: N/A     Exams:  · Sensation:    · -       Intact  light/touch (B) LE  · RLE ROM: WFL except ankle SIOMARA  · RLE Strength: Deficits: 4/5 grossly  · LLE ROM: WFL  · LLE Strength: WFL    Functional Mobility:  · Bed Mobility:     · Scooting: supervision  · Supine to Sit: supervision  · Sit to Supine: supervision  · Transfers:     · Sit to Stand:  supervision with axillary crutches  · Gait: 150' x2 trials using axillary crutches with SBA  · Stairs:  Pt ascended/descended 4 stair(s) with Axillary crutches with no handrails with Contact Guard Assistance.     AM-PAC 6 CLICK MOBILITY  Total Score:23       Therapeutic Activities and Exercises:   -Pt educated on:  A. PT POC and role of PT  B. Importance of OOB activity to improve functional outcomes after surgery  C. Maintaining WBing status on affected LE during mobility  D. DME mgmt and t/f sequencing  E. Gait sequencing  F. Performing HEP to reduce the risk of developing blood clots       Patient left up in chair with all lines intact, call button in reach and nsg notified.    GOALS:   Multidisciplinary Problems     Physical Therapy Goals     Not on file          Multidisciplinary Problems (Resolved)        Problem: Physical Therapy Goal    Goal Priority Disciplines Outcome Goal Variances Interventions   Physical Therapy Goal   (Resolved)      PT, PT/OT Outcome(s) achieved                     History:     History reviewed. No pertinent past medical history.    Past Surgical History:   Procedure Laterality Date    EYE SURGERY         Clinical Decision Making:     History  Co-morbidities and personal factors that may impact the plan of care Examination  Body Structures and Functions, activity limitations and participation restrictions that may impact the plan of care Clinical Presentation   Decision Making/ Complexity Score   Co-morbidities:   [] Time since onset of injury / illness / exacerbation  [] Status of current condition  []Patient's cognitive status and safety concerns    [] Multiple Medical Problems (see med hx)  Personal Factors:   [] Patient's age  [] Prior Level of function   [] Patient's home situation (environment and family support)  [] Patient's level of motivation  [] Expected progression of patient      HISTORY:(criteria)    [] 30611 - no personal factors/history    [] 49937 - has 1-2 personal factor/comorbidity     [] 72473 - has >3 personal factor/comorbidity     Body Regions:  [] Objective examination findings  [] Head     []  Neck  [] Trunk   [] Upper Extremity  [] Lower Extremity    Body Systems:  [] For communication ability, affect, cognition, language, and learning style: the assessment of the ability to make needs known, consciousness, orientation (person, place, and time), expected emotional /behavioral responses, and learning preferences (eg, learning barriers, education  needs)  [] For the neuromuscular system: a general assessment of gross coordinated movement (eg, balance, gait, locomotion, transfers, and transitions) and motor function  (motor control and motor learning)  [] For the musculoskeletal system: the assessment of gross symmetry, gross range of motion, gross strength, height, and weight  [] For the integumentary system: the assessment of pliability(texture), presence of scar formation, skin color, and skin  integrity  [] For cardiovascular/pulmonary system: the assessment of heart rate, respiratory rate, blood pressure, and edema     Activity limitations:    [] Patient's cognitive status and saf ety concerns          [] Status of current condition      [] Weight bearing restriction  [] Cardiopulmunary Restriction    Participation Restrictions:   [] Goals and goal agreement with the patient     [] Rehab potential (prognosis) and probable outcome      Examination of Body System: (criteria)    [] 43763 - addressing 1-2 elements    [] 13980 - addressing a total of 3 or more elements     [] 43497 -  Addressing a total of 4 or more elements         Clinical Presentation: (criteria)  Choose one     On examination of body system using standardized tests and measures patient presents with (CHOOSE ONE) elements from any of the following: body structures and functions, activity limitations, and/or participation restrictions.  Leading to a clinical presentation that is considered (CHOOSE ONE)                              Clinical Decision Making  (Eval Complexity):  Choose One     Time Tracking:     PT Received On: 10/26/18  PT Start Time: 0941     PT Stop Time: 1007  PT Total Time (min): 26 min     Billable Minutes: Evaluation 18 and Gait Training 8      Frandy Veronica, PT  10/26/2018

## 2018-10-26 NOTE — DISCHARGE SUMMARY
[facility]  Orthopedics  Discharge Summary      Patient Name: Emely Albrecht  MRN: [id number]  Admission Date: 10/24/2018  Hospital Length of Stay: 0 days  Discharge Date and Time: 10/26/2018  2:12 PM  Attending Physician: Tim Madrid MD  Discharging Provider: Kamari Masterson MD  Primary Care Provider: [facility]    HPI: Emely Albrecht is an otherwise healthy 52 y.o. 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 He felt an immediate snap in his right leg with pain, deformity, and inability to bear weight. He denies numbness or tingling. No other injuries. He did not hit his head or lose consciousness.         Procedure(s) (LRB):  INSERTION, INTRAMEDULLARY NOLA, TIBIA-right-orlin table-large triangle-big c arm on patient's left side (Right)      Hospital Course: Patient presented as above and underwent above procedure.  Tolerated it well and was discharged home POD1 after voiding, tolerating diet, ambulating, pain controlled.  Compartments were soft throughout.  Pain very well controlled.  No signs of compartment syndrome.  Discharge instructions, follow-up appointment, and med rec are below.  He is WBAT.  Patient was educated on signs and symptoms of compartment syndrome including increasing pain, swelling, pain with passive extension, paresthesias.  If there is any concern for compartment syndrome, he is to return to [facility] ED immediately for evaluation by orthopedics service.      Consults (From admission, onward)        Status Ordering Provider     Inpatient consult to Orthopedic Surgery  Once     Provider:  (Not yet assigned)    Completed ADELAIDA BARRERA            Final Active Diagnoses:    Diagnosis Date Noted POA    PRINCIPAL PROBLEM:  Closed displaced transverse fracture of shaft of right tibia wtih fibula [S82.221A] 10/24/2018 Yes    Closed fracture of right fibula and tibia  "[S82.201A, S82.401A] 10/25/2018 Yes      Problems Resolved During this Admission:      Discharged Condition: stable    Disposition: Admitted as an Inpatient    Follow Up:  Kaitlin James in 2 weeks    Patient Instructions:      BATH/SHOWER CHAIR FOR HOME USE     Order Specific Question Answer Comments   Height: 5' 10" (1.778 m)    Weight: 83.9 kg (185 lb)    Does patient have medical equipment at home? none    Length of need (1-99 months): 99    Type: Without back    Vendor: Other (use comments)    Expected Date of Delivery: 10/26/2018      COMMODE FOR HOME USE     Order Specific Question Answer Comments   Type: Standard    Height: 5' 10" (1.778 m)    Weight: 83.9 kg (185 lb)    Does patient have medical equipment at home? none    Length of need (1-99 months): 1    Vendor: Other (use comments)    Expected Date of Delivery: 10/26/2018      CRUTCHES FOR HOME USE     Order Specific Question Answer Comments   Type: Axillary    Height: 5' 10" (1.778 m)    Weight: 83.9 kg (185 lb)    Does patient have medical equipment at home? none    Length of need (1-99 months): 99    Vendor: Other (use comments)    Expected Date of Delivery: 10/26/2018      TRANSFER TUB BENCH FOR HOME USE     Order Specific Question Answer Comments   Type of Transfer Tub Bench: Unpadded    Height: 5' 10" (1.778 m)    Weight: 83.9 kg (185 lb)    Does patient have medical equipment at home? none    Length of need (1-99 months): 1    Vendor: Other (use comments)    Expected Date of Delivery: 10/26/2018      WALKER FOR HOME USE     Order Specific Question Answer Comments   Type of Walker: Adult (5'4"-6'6")    With wheels? Yes    Height: 5' 10" (1.778 m)    Weight: 83.9 kg (185 lb)    Length of need (1-99 months): 1    Does patient have medical equipment at home? none    Please check all that apply: Walker will be used for gait training.    Vendor: Other (use comments)    Expected Date of Delivery: 10/26/2018      WHEELCHAIR FOR HOME USE     Order Specific " "Question Answer Comments   Hours in W/C per day: 24    Type of Wheelchair: Standard    Size(Width): 18"(STD adult)    Leg Support: Elevating leg rests    Arm Height: Detachable    Lap Belt: Velcro    Cushion: Foam    Justification for cushion: Prevent pressure ulcers    Height: 5' 10" (1.778 m)    Weight: 83.9 kg (185 lb)    Does patient have medical equipment at home? none    Length of need (1-99 months): 99    Please check all that apply: Caregiver is capable and willing to operate wheelchair safely.    Vendor: Other (use comments)    Expected Date of Delivery: 10/26/2018      CRUTCHES FOR HOME USE     Order Specific Question Answer Comments   Type: Axillary    Height: 5' 10" (1.778 m)    Weight: 83.9 kg (185 lb)    Does patient have medical equipment at home? none    Length of need (1-99 months): 12    Vendor: [employer]    Expected Date of Delivery: 10/26/2018      COMMODE FOR HOME USE     Order Specific Question Answer Comments   Type: Standard    Height: 5' 10" (1.778 m)    Weight: 83.9 kg (185 lb)    Does patient have medical equipment at home? none    Length of need (1-99 months): 99    Vendor: [facility]    Expected Date of Delivery: 10/26/2018      Call MD for:  temperature >100.4     Call MD for:  persistent nausea and vomiting or diarrhea     Call MD for:  severe uncontrolled pain     Call MD for:  redness, tenderness, or signs of infection (pain, swelling, redness, odor or green/yellow discharge around incision site)     Call MD for:  difficulty breathing or increased cough     Call MD for:  severe persistent headache     Call MD for:  worsening rash     Call MD for:  persistent dizziness, light-headedness, or visual disturbances     Call MD for:  increased confusion or weakness     Sponge bath only until clinic visit     Keep surgical extremity elevated     Ice to affected area     Leave dressing on - Keep it clean, dry, and intact until clinic visit     Medications:  Reconciled Home Medications:    "   Medication List      START taking these medications    docusate sodium 100 MG capsule  Commonly known as:  COLACE  Take 1 capsule (100 mg total) by mouth 3 (three) times daily.     methocarbamol 750 MG Tab  Commonly known as:  ROBAXIN  Take 1 tablet (750 mg total) by mouth 4 (four) times daily. for 10 days     oxyCODONE 5 MG immediate release tablet  Commonly known as:  ROXICODONE  Take 1 tablet (5 mg total) by mouth every 4 (four) hours as needed for Pain (For pain not relieved by percocet).     oxyCODONE-acetaminophen  mg per tablet  Commonly known as:  PERCOCET  Take 1 tablet by mouth every 4 (four) hours as needed. Patient is in the immediate postoperative period.     polyethylene glycol 17 gram/dose powder  Commonly known as:  GLYCOLAX  Take 17 g by mouth once daily.     promethazine 25 MG tablet  Commonly known as:  PHENERGAN  Take 1 tablet (25 mg total) by mouth every 4 (four) hours as needed for Nausea.            Kamari Masterson MD  Orthopedics  [facility]

## 2018-10-26 NOTE — PLAN OF CARE
Problem: Patient Care Overview  Goal: Plan of Care Review  Outcome: Ongoing (interventions implemented as appropriate)  Patient AAOx4 and VSS throughout the night. Q2H rounding and white board updating maintained. Call bell within reach. Patient had minimal complaints of pain throughout the night. Ice applied to RLE. Dressings remained intact -- no drainage noted. No skin breakdown noted. Will continue to monitor and continue plan of care.

## 2018-10-26 NOTE — PT/OT/SLP EVAL
Occupational Therapy   Evaluation and Discharge Note    Name: Emely Albrecht  MRN: [id number]  Admitting Diagnosis:  Closed displaced transverse fracture of shaft of right tibia 1 Day Post-Op    Recommendations:     Discharge Recommendations: outpatient PT  Discharge Equipment Recommendations:  bedside commode, crutches, axillary  Barriers to discharge:  None    History:     Occupational Profile:  Living Environment: Pt lives with his wife in [facility] w/ 3 PARUL, no HR support. Pt uses a tub/shower combo  Previous level of function: PTA, pt reports being independent w/ ADLs and functional mobility   Equipment Owned:  none  Assistance upon Discharge: Pt reports his wife is able to assist upon d/c from hospital.    History reviewed. No pertinent past medical history.    Past Surgical History:   Procedure Laterality Date    EYE SURGERY      INSERTION, INTRAMEDULLARY NOLA, TIBIA-right-orlin table-large triangle-big c arm on patient's left side Right 10/25/2018    Performed by Tim Madrid MD at [facility] OR [address] [place] [address]       Subjective     Chief Complaint: R knee pain  Patient/Family stated goals: to return home safely   Communicated with: RN prior to session.  Pain/Comfort:  · Pain Rating 1: 3/10  · Location - Side 1: Right  · Location - Orientation 1: anterior  · Location 1: knee  · Pain Addressed 1: Pre-medicate for activity, Reposition, Cessation of Activity  · Pain Rating Post-Intervention 1: 3/10    Patients cultural, spiritual, Denominational conflicts given the current situation: none stated     Objective:     Patient found with: peripheral IV    General Precautions: Standard, fall   Orthopedic Precautions:RLE weight bearing as tolerated   Braces: N/A     Occupational Performance:    Bed Mobility:    · Patient completed Scooting/Bridging with modified independence  · Patient completed Supine to Sit with modified independence  · Patient completed Sit to Supine with modified independence    Functional  Mobility/Transfers:  · Patient completed Sit <> Stand Transfer with supervision  with  axillary crutches   · Functional Mobility: Pt ambulated community distance w/ SBA and crutches; pt demonstrated good understanding of gait sequence and management of crutches. Pt participated in stair training w/ PT - refer to PT note for further details.     Activities of Daily Living:  · Upper Body Dressing: independence donning tshirt sitting EOB  · Lower Body Dressing: stand by assistance donning underwear and shorts sitting EOB    Cognitive/Visual Perceptual:  Cognitive/Psychosocial Skills:     -       Oriented to: Person, Place, Time and Situation   -       Follows Commands/attention:Follows multistep  commands  -       Communication: clear/fluent    Physical Exam:  Upper Extremity Range of Motion:     -       Right Upper Extremity: WFL   -       Left Upper Extremity: WFL    Upper Extremity Strength:    -       Right Upper Extremity: WFL  -       Left Upper Extremity: WFL    Strength:    -       Right Upper Extremity: WFL   -       Left Upper Extremity: WFL   Fine Motor Coordination:    -       Intact    Patient left up in chair with all lines intact, call button in reach and spouse present    [facility] 6 Click: Self-care  [facility] Total Score: 23    Treatment & Education:  - OT role/POC  - Importance of OOB activity to maximize recovery   - Safety w/ functional mobility; hand placement to ensure safe transfers   - LBD adaptive techniques to facilitate task and promote functional independence  - Educated pt and spouse on various AD to facilitate safe tub/shower transfers for when pt is able to shower.   Education:    Assessment:     Emely Albrecht is a 52 y.o. male with a medical diagnosis of Closed displaced transverse fracture of shaft of right tibia. At this time, patient is functioning just below his prior level of function and does not require further acute OT services.     Clinical Decision Makin.  OT Low:   ""Pt evaluation falls under low complexity for evaluation coding due to performance deficits noted in 1-3 areas as stated above and 0 co-morbities affecting current functional status. Data obtained from problem focused assessments. No modifications or assistance was required for completion of evaluation. Only brief occupational profile and history review completed."     Plan:     During this hospitalization, patient does not require further acute OT services.  Please re-consult if situation changes.    · Plan of Care Reviewed with: patient, spouse    This Plan of care has been discussed with the patient who was involved in its development and understands and is in agreement with the identified goals and treatment plan    GOALS:   Multidisciplinary Problems     Occupational Therapy Goals     Not on file          Multidisciplinary Problems (Resolved)        Problem: Occupational Therapy Goal    Goal Priority Disciplines Outcome Interventions   Occupational Therapy Goal   (Resolved)     OT, PT/OT Outcome(s) achieved                    Time Tracking:     OT Date of Treatment: 10/26/18  OT Start Time: 0941  OT Stop Time: 1004  OT Total Time (min): 23 min    Billable Minutes:Evaluation 10  Self Care/Home Management 13    Marylin Joseph OT  10/26/2018    "

## 2018-10-26 NOTE — PLAN OF CARE
Problem: Physical Therapy Goal  Goal: Physical Therapy Goal  Outcome: Outcome(s) achieved Date Met: 10/26/18  PT eval complete. Pt tolerated session well today with no complications. Pt able to perform ambulation and stair training with axillary crutches with mild instability which improved as pt continued to walk a farther distance. Pt verbalized understanding of car t/f technique and performing HEP. Pt safe to d/c home with OPPT, axillary crutches, and bedside commode.

## 2018-10-29 NOTE — ANESTHESIA POSTPROCEDURE EVALUATION
"Anesthesia Post Evaluation    Patient: Emely Albrecht    Procedure(s) Performed: Procedure(s) (LRB):  INSERTION, INTRAMEDULLARY NOLA, TIBIA-right-orlin table-large triangle-big c arm on patient's left side (Right)    Final Anesthesia Type: general  Patient location during evaluation: PACU  Patient participation: Yes- Able to Participate  Level of consciousness: awake and alert  Post-procedure vital signs: reviewed and stable  Pain management: adequate  Airway patency: patent  PONV status at discharge: No PONV  Anesthetic complications: no      Cardiovascular status: hemodynamically stable  Respiratory status: room air, spontaneous ventilation and unassisted  Hydration status: euvolemic  Follow-up not needed.        Visit Vitals  BP (!) 102/53 (BP Location: Left arm, Patient Position: Lying)   Pulse 81   Temp 36.7 °C (98 °F) (Oral)   Resp 18   Ht 5' 10" (1.778 m)   Wt 83.9 kg (185 lb)   SpO2 96%   BMI 26.54 kg/m²       Pain/Darlene Score: No Data Recorded      "

## 2018-10-30 ENCOUNTER — TELEPHONE (OUTPATIENT)
Dept: ORTHOPEDICS | Facility: CLINIC | Age: 53
End: 2018-10-30

## 2018-10-30 NOTE — TELEPHONE ENCOUNTER
----- Message from Emperatriz Norris sent at 10/30/2018  1:39 PM CDT -----  Contact: Self/ [phone]  Patient would like a call back to change his post-op appt if possible to 11/7/18 or 11/8/18 in the morning time if possible.

## 2018-11-07 ENCOUNTER — OFFICE VISIT (OUTPATIENT)
Dept: ORTHOPEDICS | Facility: CLINIC | Age: 53
End: 2018-11-07
Payer: COMMERCIAL

## 2018-11-07 VITALS
WEIGHT: 184.94 LBS | DIASTOLIC BLOOD PRESSURE: 77 MMHG | SYSTOLIC BLOOD PRESSURE: 134 MMHG | HEIGHT: 70 IN | TEMPERATURE: 98 F | HEART RATE: 82 BPM | RESPIRATION RATE: 18 BRPM | BODY MASS INDEX: 26.48 KG/M2

## 2018-11-07 DIAGNOSIS — S82.221A CLOSED DISPLACED TRANSVERSE FRACTURE OF SHAFT OF RIGHT TIBIA, INITIAL ENCOUNTER: Primary | ICD-10-CM

## 2018-11-07 PROCEDURE — 99999 PR PBB SHADOW E&M-EST. PATIENT-LVL III: CPT | Mod: PBBFAC,,, | Performed by: PHYSICIAN ASSISTANT

## 2018-11-07 PROCEDURE — 99024 POSTOP FOLLOW-UP VISIT: CPT | Mod: S$GLB,,, | Performed by: PHYSICIAN ASSISTANT

## 2018-11-09 ENCOUNTER — TELEPHONE (OUTPATIENT)
Dept: ORTHOPEDICS | Facility: CLINIC | Age: 53
End: 2018-11-09

## 2018-11-14 ENCOUNTER — TELEPHONE (OUTPATIENT)
Dept: ORTHOPEDICS | Facility: CLINIC | Age: 53
End: 2018-11-14

## 2018-12-04 ENCOUNTER — TELEPHONE (OUTPATIENT)
Dept: ORTHOPEDICS | Facility: CLINIC | Age: 53
End: 2018-12-04

## 2018-12-04 NOTE — TELEPHONE ENCOUNTER
----- Message from Belen Sloan sent at 12/4/2018  1:49 PM CST -----  Contact: Shelbie Rodney RN Case Mgr - Workers Comp  Shelbie states ask for the patient's PT orders be sent to  fax also ask to reschedule patient's appointment to 12/19 or 12/20 Shelbie can be reach at

## 2018-12-04 NOTE — TELEPHONE ENCOUNTER
Therapy and doctor appointment fax to Shelbie Rodney RN Case Mgr - Workers Comp [phone]. Done.

## 2018-12-19 ENCOUNTER — OFFICE VISIT (OUTPATIENT)
Dept: ORTHOPEDICS | Facility: CLINIC | Age: 53
End: 2018-12-19
Payer: COMMERCIAL

## 2018-12-19 ENCOUNTER — HOSPITAL ENCOUNTER (OUTPATIENT)
Dept: RADIOLOGY | Facility: HOSPITAL | Age: 53
Discharge: HOME | End: 2018-12-19
Attending: PHYSICIAN ASSISTANT
Payer: COMMERCIAL

## 2018-12-19 VITALS — HEIGHT: 70 IN | BODY MASS INDEX: 26.48 KG/M2 | WEIGHT: 184.94 LBS

## 2018-12-19 DIAGNOSIS — S82.221A CLOSED DISPLACED TRANSVERSE FRACTURE OF SHAFT OF RIGHT TIBIA, INITIAL ENCOUNTER: Primary | ICD-10-CM

## 2018-12-19 DIAGNOSIS — S82.221A CLOSED DISPLACED TRANSVERSE FRACTURE OF SHAFT OF RIGHT TIBIA, INITIAL ENCOUNTER: ICD-10-CM

## 2018-12-19 PROCEDURE — 99024 POSTOP FOLLOW-UP VISIT: CPT | Mod: S$GLB,,, | Performed by: PHYSICIAN ASSISTANT

## 2018-12-19 PROCEDURE — 73590 X-RAY EXAM OF LOWER LEG: CPT | Mod: TC,RT

## 2018-12-19 PROCEDURE — 99999 PR PBB SHADOW E&M-EST. PATIENT-LVL III: CPT | Mod: PBBFAC,,, | Performed by: PHYSICIAN ASSISTANT

## 2018-12-19 PROCEDURE — 73590 X-RAY EXAM OF LOWER LEG: CPT | Mod: 26,RT,, | Performed by: RADIOLOGY

## 2018-12-19 NOTE — PROGRESS NOTES
Mr. Albrecht is a 52-year-old male who fell backwards ~4 feet while trying to climb into an excavator  at work on 10/24/2018 resulting in a closed fracture of the right tibia and fibular shaft.  Patient was treated with Im nail on 10/25/2018    Mr. Albrecht is here today for a post-operative visit after a   Intramedullary nail fixation of right tibial shaft fracture  by Dr. Madrid on 10/25/2018.    IMPLANTS:  Synthes EX tibial nail 345 x 12 mm with two proximal and two distal interlocking screws    Interval History:    he reports that he is doing well.  Pain is controleld.  he is not taking pain medication.    He just started PT.   he denies fever, chills, and sweats since the time of the surgery.     Physical exam:  Walked into clinic unassisted, incision has appropriate healing,  FROM of knee and ankle, mild swelling.     RADS: Postop ORIF the tibia.  Fibular fracture noted as well.  Position alignment are similar.    Assessment:  Post-op visit (8 weeks)    Plan:  Current care, treatment plan, precautions, activity level/ modifications, limitations, rehabilitation exercises and proposed future treatment were discussed with the patient. We discussed the need to monitor for changes in symptoms and condition and report them to the physician.  Discussed importance of compliance with all appointments and follow up examinations.   - The patient was advised to keep the incision clean and dry for the next 24 hours after which he may wash the area with antibacterial soap in the shower. Will not submerge until the incision is completely healed  -Patient was advised to monitor wound closely and multiple times daily for any problems. Call clinic immediately or report to ED for immediate medical attention for any complications including reopening of wound, drainage, purulence, redness, streaking, odor, pain out of proportion, fever, chills, etc.   -PT/OT, [facility], Patient is responsible to establish and continue  care   -range of motion as tolerated    -WBAT   - pain medication: no refill needed,    Pain medication refill policy provided to patient for review.   - Patient is to return to clinic in 8 weeks  At time x-ray of his tib fib is needed     If there are any questions prior to scheduled follow up, the patient was instructed to contact the office    -occupation:   unable to RTW at this time.

## 2019-02-13 ENCOUNTER — OFFICE VISIT (OUTPATIENT)
Dept: ORTHOPEDICS | Facility: CLINIC | Age: 54
End: 2019-02-13
Payer: COMMERCIAL

## 2019-02-13 ENCOUNTER — HOSPITAL ENCOUNTER (OUTPATIENT)
Dept: RADIOLOGY | Facility: HOSPITAL | Age: 54
Discharge: HOME | End: 2019-02-13
Attending: PHYSICIAN ASSISTANT
Payer: COMMERCIAL

## 2019-02-13 VITALS — HEIGHT: 70 IN | BODY MASS INDEX: 26.48 KG/M2 | WEIGHT: 184.94 LBS

## 2019-02-13 DIAGNOSIS — T14.90XA TRAUMA: Primary | ICD-10-CM

## 2019-02-13 DIAGNOSIS — T14.90XA TRAUMA: ICD-10-CM

## 2019-02-13 PROCEDURE — 73590 X-RAY EXAM OF LOWER LEG: CPT | Mod: TC,RT

## 2019-02-13 PROCEDURE — 73590 XR TIBIA FIBULA 2 VIEW RIGHT: ICD-10-PCS | Mod: 26,RT,, | Performed by: RADIOLOGY

## 2019-02-13 PROCEDURE — 99213 PR OFFICE/OUTPT VISIT, EST, LEVL III, 20-29 MIN: ICD-10-PCS | Mod: S$GLB,,, | Performed by: PHYSICIAN ASSISTANT

## 2019-02-13 PROCEDURE — 99999 PR PBB SHADOW E&M-EST. PATIENT-LVL III: ICD-10-PCS | Mod: PBBFAC,,, | Performed by: PHYSICIAN ASSISTANT

## 2019-02-13 PROCEDURE — 73590 X-RAY EXAM OF LOWER LEG: CPT | Mod: 26,RT,, | Performed by: RADIOLOGY

## 2019-02-13 PROCEDURE — 99999 PR PBB SHADOW E&M-EST. PATIENT-LVL III: CPT | Mod: PBBFAC,,, | Performed by: PHYSICIAN ASSISTANT

## 2019-02-13 PROCEDURE — 99213 OFFICE O/P EST LOW 20 MIN: CPT | Mod: S$GLB,,, | Performed by: PHYSICIAN ASSISTANT

## 2019-02-14 ENCOUNTER — TELEPHONE (OUTPATIENT)
Dept: ORTHOPEDICS | Facility: CLINIC | Age: 54
End: 2019-02-14

## 2019-02-14 NOTE — TELEPHONE ENCOUNTER
----- Message from Kaitlin James PA-C sent at 2/14/2019  1:49 PM CST -----  Contact: Shelbie RodneyRN - Workers Comp   Please write patient note unable to return to work at this time.     ----- Message -----  From: Vale Kerns MA  Sent: 2/14/2019   1:10 PM  To: Kaitlin James PA-C        ----- Message -----  From: Belen Sloan  Sent: 2/14/2019  12:30 PM  To: [facility] Staff    Needs Advice    Reason for call: Off Duty Slip        Communication Preference: Phone     Additional Information: Shelbie states that the pt need an off duty slip also is requesting a return call

## 2019-02-15 NOTE — PROGRESS NOTES
Mr. Albrecht is a 52-year-old male who fell backwards ~4 feet while trying to climb into an excavator  at work on 10/24/2018 resulting in a closed fracture of the right tibia and fibular shaft.  Patient was treated with Im nail on 10/25/2018    Mr. Albrecht is here today for a post-operative visit after a   Intramedullary nail fixation of right tibial shaft fracture  by Dr. Madrid on 10/25/2018.    IMPLANTS:  Synthes EX tibial nail 345 x 12 mm with two proximal and two distal interlocking screws    Interval History:    he reports that he is doing well.  Pain is controleld.  he is not taking pain medication.    He is attending PT and is doing well with this. He has continued trouble with balance in climbing stairs as well as ladders which they are practicing.he also has some pain at fracture site with these activities.   he denies fever, chills, and sweats since the time of the surgery.     Physical exam:  Walked into clinic unassisted, incision has appropriate healing,  FROM of knee and ankle, mild swelling.     RADS: Postoperative changes of internal fixation of the distal tibia fracture identified as before.  Healing fracture of the distal fibula and tibia identified.  The position alignment is satisfactory and unchanged as compared to the previous study    Assessment:  Post-op visit (16 weeks)    Plan:  Current care, treatment plan, precautions, activity level/ modifications, limitations, rehabilitation exercises and proposed future treatment were discussed with the patient. We discussed the need to monitor for changes in symptoms and condition and report them to the physician.  Discussed importance of compliance with all appointments and follow up examinations.   -  he may wash the area with antibacterial soap in the shower. Will not submerge until the incision is completely healed  -Patient was advised to monitor wound closely and multiple times daily for any problems. Call clinic immediately or report to ED for  immediate medical attention for any complications including reopening of wound, drainage, purulence, redness, streaking, odor, pain out of proportion, fever, chills, etc.   -PT/OT, [facility], Patient is responsible to establish and continue care   -range of motion as tolerated    -WBAT   - pain medication: no refill needed,    Pain medication refill policy provided to patient for review.   - Patient is to return to clinic in 12 weeks with   At time x-ray of his tib fib is needed     If there are any questions prior to scheduled follow up, the patient was instructed to contact the office    -occupation:   unable to RTW at this time.

## 2019-03-27 ENCOUNTER — TELEPHONE (OUTPATIENT)
Dept: ORTHOPEDICS | Facility: CLINIC | Age: 54
End: 2019-03-27

## 2019-03-27 NOTE — TELEPHONE ENCOUNTER
Pt request to be sooner than 5/8/19. Pt new appointment is 4/03/19 at 8:30 am/mailed. Patient states verbal understanding and has no further questions.

## 2019-04-03 ENCOUNTER — OFFICE VISIT (OUTPATIENT)
Dept: ORTHOPEDICS | Facility: CLINIC | Age: 54
End: 2019-04-03
Payer: COMMERCIAL

## 2019-04-03 ENCOUNTER — HOSPITAL ENCOUNTER (OUTPATIENT)
Dept: RADIOLOGY | Facility: HOSPITAL | Age: 54
Discharge: HOME | End: 2019-04-03
Attending: ORTHOPAEDIC SURGERY
Payer: COMMERCIAL

## 2019-04-03 DIAGNOSIS — S82.221D CLOSED DISPLACED TRANSVERSE FRACTURE OF SHAFT OF RIGHT TIBIA WITH ROUTINE HEALING, SUBSEQUENT ENCOUNTER: Primary | ICD-10-CM

## 2019-04-03 DIAGNOSIS — S82.201D CLOSED FRACTURE OF RIGHT TIBIA AND FIBULA WITH ROUTINE HEALING, SUBSEQUENT ENCOUNTER: ICD-10-CM

## 2019-04-03 DIAGNOSIS — S82.401D CLOSED FRACTURE OF RIGHT TIBIA AND FIBULA WITH ROUTINE HEALING, SUBSEQUENT ENCOUNTER: ICD-10-CM

## 2019-04-03 DIAGNOSIS — S82.401D CLOSED FRACTURE OF RIGHT TIBIA AND FIBULA WITH ROUTINE HEALING, SUBSEQUENT ENCOUNTER: Primary | ICD-10-CM

## 2019-04-03 DIAGNOSIS — S82.201D CLOSED FRACTURE OF RIGHT TIBIA AND FIBULA WITH ROUTINE HEALING, SUBSEQUENT ENCOUNTER: Primary | ICD-10-CM

## 2019-04-03 PROCEDURE — 73590 X-RAY EXAM OF LOWER LEG: CPT | Mod: 26,RT,, | Performed by: RADIOLOGY

## 2019-04-03 PROCEDURE — 99214 PR OFFICE/OUTPT VISIT, EST, LEVL IV, 30-39 MIN: ICD-10-PCS | Mod: S$GLB,,, | Performed by: ORTHOPAEDIC SURGERY

## 2019-04-03 PROCEDURE — 73590 XR TIBIA FIBULA 2 VIEW RIGHT: ICD-10-PCS | Mod: 26,RT,, | Performed by: RADIOLOGY

## 2019-04-03 PROCEDURE — 73590 X-RAY EXAM OF LOWER LEG: CPT | Mod: TC,RT

## 2019-04-03 PROCEDURE — 99999 PR PBB SHADOW E&M-EST. PATIENT-LVL II: CPT | Mod: PBBFAC,,, | Performed by: ORTHOPAEDIC SURGERY

## 2019-04-03 PROCEDURE — 99214 OFFICE O/P EST MOD 30 MIN: CPT | Mod: S$GLB,,, | Performed by: ORTHOPAEDIC SURGERY

## 2019-04-03 PROCEDURE — 99999 PR PBB SHADOW E&M-EST. PATIENT-LVL II: ICD-10-PCS | Mod: PBBFAC,,, | Performed by: ORTHOPAEDIC SURGERY

## 2019-04-03 NOTE — PROGRESS NOTES
HPI:   52-year-old male who fell backwards ~4 feet while trying to climb into an excavator at work on 10/24/2018 resulting in a closed fracture of the right tibia and fibular shaft. Patient was treated with IMN on 10/25/2018.  He has been weight-bearing as tolerated and working with physical therapy.  He is doing quite well overall.  He has some mild pain in the mid tibia with prolonged activity which is improving.  This is largely in the anterior knee.    IMPLANTS: Synthes EX tibial nail 345 x 12 mm with two proximal and two distal interlocking screws    ROS:  Patient denies constitutional symptoms, cardiac symptoms, respiratory symptoms, GI symptoms.  The remainder of the musculoskeletal ROS is included in the HPI.    PE:    AA&O x 4.  NAD  HEENT:  NCAT, sclera nonicteric  Lungs:  Respirations are equal and unlabored.  CV:  2+ bilateral upper and lower extremity pulses.  Skin:  Intact throughout.    MS:  Good alignment.  No ligamentous instability.  Normal patellar tracking and tilt.  No tenderness to palpation at the inferior pole of patella.  Neurovascularly intact distal.  Popliteal angle 30°.  ROM knee 0/0/135.       Rads:  Intact nail with good healing in all 4 cortices.  Fibular fracture healed.    A/P:  4 months status post intramedullary nail fixation of right tibia fracture.  He is doing very well overall.  He will continue with physical therapy and at this point I will write for them to begin work hardening.  I will allow him to go back to light duty at work without carrying anything heavy or working heavy equipment or at heights.  I will see him back in clinic in 2-3 months time with x-rays of the right tibia.  I expect that he will likely be at [employer] at that time.  If he is ready to return to full duty then we will proceed with this, if not, I will send him for a functional capacity evaluation.

## 2019-04-15 ENCOUNTER — TELEPHONE (OUTPATIENT)
Dept: ORTHOPEDICS | Facility: CLINIC | Age: 54
End: 2019-04-15

## 2019-04-15 NOTE — TELEPHONE ENCOUNTER
Faxed PT orders for work conditioning to Shelbie as requested.   She will send pt to [facility] for work conditioning as [facility] does not perform this service.

## 2019-04-15 NOTE — TELEPHONE ENCOUNTER
----- Message from Rubina Jhaveri sent at 4/15/2019  1:01 PM CDT -----  Contact: Rafael elliott nurse  Shelbie stated Order for Workmans Comp was sent sent to the wrong place.     Please fax to: [phone] or call [phone]

## 2019-06-05 ENCOUNTER — HOSPITAL ENCOUNTER (OUTPATIENT)
Dept: RADIOLOGY | Facility: HOSPITAL | Age: 54
Discharge: HOME | End: 2019-06-05
Attending: ORTHOPAEDIC SURGERY
Payer: COMMERCIAL

## 2019-06-05 ENCOUNTER — OFFICE VISIT (OUTPATIENT)
Dept: ORTHOPEDICS | Facility: CLINIC | Age: 54
End: 2019-06-05
Payer: COMMERCIAL

## 2019-06-05 DIAGNOSIS — M70.50 PES ANSERINE BURSITIS: ICD-10-CM

## 2019-06-05 DIAGNOSIS — S82.221D CLOSED DISPLACED TRANSVERSE FRACTURE OF SHAFT OF RIGHT TIBIA WITH ROUTINE HEALING, SUBSEQUENT ENCOUNTER: ICD-10-CM

## 2019-06-05 DIAGNOSIS — S82.221D CLOSED DISPLACED TRANSVERSE FRACTURE OF SHAFT OF RIGHT TIBIA WITH ROUTINE HEALING, SUBSEQUENT ENCOUNTER: Primary | ICD-10-CM

## 2019-06-05 PROCEDURE — 73590 X-RAY EXAM OF LOWER LEG: CPT | Mod: TC,RT

## 2019-06-05 PROCEDURE — 99214 PR OFFICE/OUTPT VISIT, EST, LEVL IV, 30-39 MIN: ICD-10-PCS | Mod: S$GLB,,, | Performed by: ORTHOPAEDIC SURGERY

## 2019-06-05 PROCEDURE — 73590 XR TIBIA FIBULA 2 VIEW RIGHT: ICD-10-PCS | Mod: 26,RT,, | Performed by: RADIOLOGY

## 2019-06-05 PROCEDURE — 73590 X-RAY EXAM OF LOWER LEG: CPT | Mod: 26,RT,, | Performed by: RADIOLOGY

## 2019-06-05 PROCEDURE — 99214 OFFICE O/P EST MOD 30 MIN: CPT | Mod: S$GLB,,, | Performed by: ORTHOPAEDIC SURGERY

## 2019-06-05 PROCEDURE — 99999 PR PBB SHADOW E&M-EST. PATIENT-LVL II: CPT | Mod: PBBFAC,,, | Performed by: ORTHOPAEDIC SURGERY

## 2019-06-05 PROCEDURE — 99999 PR PBB SHADOW E&M-EST. PATIENT-LVL II: ICD-10-PCS | Mod: PBBFAC,,, | Performed by: ORTHOPAEDIC SURGERY

## 2019-06-05 NOTE — PROGRESS NOTES
HPI:   52-year-old male who fell backwards ~4 feet while trying to climb into an excavator at work on 10/24/2018 resulting in a closed fracture of the right tibia and fibular shaft. Patient was treated with IMN on 10/25/2018.  He has been weight-bearing as tolerated and working with physical therapy.  He is doing quite well overall.  He is having some pain over tib ant. Also having some anterior knee pain as well as pain over pes tendons. Overall he is doing well and is back to his usual activities without issue.  He has not returned to work.  He works as a .    IMPLANTS: Synthes EX tibial nail 345 x 12 mm with two proximal and two distal interlocking screws    ROS:  Patient denies constitutional symptoms, cardiac symptoms, respiratory symptoms, GI symptoms.  The remainder of the musculoskeletal ROS is included in the HPI.    PE:    AA&O x 4.  NAD  HEENT:  NCAT, sclera nonicteric  Lungs:  Respirations are equal and unlabored.  CV:  2+ bilateral upper and lower extremity pulses.  Skin:  Intact throughout.    MS:  Good alignment.  No ligamentous instability.  Normal patellar tracking and tilt.  Some TTP over pes bursa.   Popliteal angle 45°.  ROM knee 0/0/135.       Rads:  Intact nail , well healed with no signs of loosening.  Fibular fracture healed.    A/P:  8 months status post intramedullary nail fixation of right tibia fracture. He has some scar in the tibialis anterior and some mild pes bursitis.  We went over some stretching and massage techniques for both of these.  He is doing very well overall and fractures are fully healed. I believe that he has reached [employer], and should be able to return to work.  Whether he would tolerate this is a  remains to be seen. At this point will send him back to [facility] for a disability rating.  Activity as tolerated.

## 2019-07-12 ENCOUNTER — TELEPHONE (OUTPATIENT)
Dept: ORTHOPEDICS | Facility: CLINIC | Age: 54
End: 2019-07-12

## 2019-07-12 NOTE — TELEPHONE ENCOUNTER
Spoke with Raudel at [facility].  Advised that we need request for medical records in writing, along with signed DAX by pt.   Raudel will fax to my attention for processing.

## 2019-07-12 NOTE — TELEPHONE ENCOUNTER
----- Message from Rosalind Anne sent at 7/12/2019 12:10 PM CDT -----  Contact: [email or web address]#[phone] fax#[phone]  Needs Advice    Reason for call:Kelsy is requesting recent visit notes for this patient.        Communication Preference:    Additional Information:

## 2019-07-15 ENCOUNTER — DOCUMENTATION ONLY (OUTPATIENT)
Dept: ORTHOPEDICS | Facility: CLINIC | Age: 54
End: 2019-07-15

## 2019-07-15 NOTE — PROGRESS NOTES
Received a written request for last office notes along with signed DAX.   Forwarded to medical records for processing at ext [phone]

## 2019-12-11 ENCOUNTER — TELEPHONE (OUTPATIENT)
Dept: ORTHOPEDICS | Facility: CLINIC | Age: 54
End: 2019-12-11

## 2019-12-11 NOTE — TELEPHONE ENCOUNTER
Notified pt that to receive FCE . Fax to [phone]. Patient states verbal understanding and has no further questions.

## 2019-12-11 NOTE — TELEPHONE ENCOUNTER
----- Message from Kaitlin James PA-C sent at 12/11/2019 11:15 AM CST -----  Contact: pt @ [phone]  Please have him resend    ----- Message -----  From: Vale Kerns MA  Sent: 12/11/2019   9:15 AM CST  To: Kaitlin James PA-C        ----- Message -----  From: Yoni Bullock  Sent: 12/11/2019   9:04 AM CST  To: [facility]    Pt calling to confirm office received FCE results

## 2019-12-19 ENCOUNTER — HOSPITAL ENCOUNTER (OUTPATIENT)
Dept: RADIOLOGY | Facility: HOSPITAL | Age: 54
Discharge: HOME | End: 2019-12-19
Attending: ORTHOPAEDIC SURGERY

## 2019-12-19 ENCOUNTER — OFFICE VISIT (OUTPATIENT)
Dept: ORTHOPEDICS | Facility: CLINIC | Age: 54
End: 2019-12-19
Payer: COMMERCIAL

## 2019-12-19 ENCOUNTER — TELEPHONE (OUTPATIENT)
Dept: ORTHOPEDICS | Facility: CLINIC | Age: 54
End: 2019-12-19

## 2019-12-19 VITALS — WEIGHT: 185 LBS | HEIGHT: 70 IN | BODY MASS INDEX: 26.48 KG/M2

## 2019-12-19 DIAGNOSIS — S82.221D CLOSED DISPLACED TRANSVERSE FRACTURE OF SHAFT OF RIGHT TIBIA WITH ROUTINE HEALING, SUBSEQUENT ENCOUNTER: ICD-10-CM

## 2019-12-19 DIAGNOSIS — S82.221D CLOSED DISPLACED TRANSVERSE FRACTURE OF SHAFT OF RIGHT TIBIA WITH ROUTINE HEALING, SUBSEQUENT ENCOUNTER: Primary | ICD-10-CM

## 2019-12-19 PROCEDURE — 73590 XR TIBIA FIBULA 2 VIEW RIGHT: ICD-10-PCS | Mod: 26,RT,, | Performed by: RADIOLOGY

## 2019-12-19 PROCEDURE — 99214 PR OFFICE/OUTPT VISIT, EST, LEVL IV, 30-39 MIN: ICD-10-PCS | Mod: S$GLB,,, | Performed by: ORTHOPAEDIC SURGERY

## 2019-12-19 PROCEDURE — 73610 XR ANKLE COMPLETE 3 VIEW RIGHT: ICD-10-PCS | Mod: 26,RT,, | Performed by: RADIOLOGY

## 2019-12-19 PROCEDURE — 73610 X-RAY EXAM OF ANKLE: CPT | Mod: 26,RT,, | Performed by: RADIOLOGY

## 2019-12-19 PROCEDURE — 99214 OFFICE O/P EST MOD 30 MIN: CPT | Mod: S$GLB,,, | Performed by: ORTHOPAEDIC SURGERY

## 2019-12-19 PROCEDURE — 99999 PR PBB SHADOW E&M-EST. PATIENT-LVL III: CPT | Mod: PBBFAC,,, | Performed by: ORTHOPAEDIC SURGERY

## 2019-12-19 PROCEDURE — 73590 X-RAY EXAM OF LOWER LEG: CPT | Mod: TC,RT

## 2019-12-19 PROCEDURE — 73590 X-RAY EXAM OF LOWER LEG: CPT | Mod: 26,RT,, | Performed by: RADIOLOGY

## 2019-12-19 PROCEDURE — 73610 X-RAY EXAM OF ANKLE: CPT | Mod: TC,RT

## 2019-12-19 PROCEDURE — 99999 PR PBB SHADOW E&M-EST. PATIENT-LVL III: ICD-10-PCS | Mod: PBBFAC,,, | Performed by: ORTHOPAEDIC SURGERY

## 2019-12-19 NOTE — TELEPHONE ENCOUNTER
----- Message from Reena Barnes sent at 12/19/2019  4:50 PM CST -----  Contact: self [phone]  Pt states he would like to speak with nurse in regards to his paper work please call back to discuss

## 2019-12-19 NOTE — PROGRESS NOTES
HPI:   52-year-old male who fell backwards ~4 feet while trying to climb into an excavator at work on 10/24/2018 resulting in a closed fracture of the right tibia and fibular shaft. Patient was treated with IMN on 10/25/2018.  He has been weight-bearing as tolerated and working with physical therapy.  He is doing quite well overall.  He is having some pain over tib ant. Also having some anterior knee pain as well as pain over pes tendons. Overall he is doing well and is back to his usual activities without issue.  He has not returned to work.  He works as a .    Interval update December 19, 2019:  Patient returns to clinic today for follow-up overall he is doing very well he has minimal anterior knee pain that resolves with walking.  He has no tenderness to palpation distally and he has been having no issues with walking, although he has not begun any high impact activity like running.  He denies numbness tingling or pain anywhere else.    IMPLANTS: Synthes EX tibial nail 345 x 12 mm with two proximal and two distal interlocking screws    ROS:  Patient denies constitutional symptoms, cardiac symptoms, respiratory symptoms, GI symptoms.  The remainder of the musculoskeletal ROS is included in the HPI.    PE:    AA&O x 4.  NAD  HEENT:  NCAT, sclera nonicteric  Lungs:  Respirations are equal and unlabored.  CV:  2+ bilateral upper and lower extremity pulses.  Skin:  Intact throughout.    MS:  Good alignment.  No ligamentous instability.  Normal patellar tracking and tilt.  Mild tenderness to palpation over anterior knee.  Popliteal angle 45°.  ROM knee 0/0/135.  Ankle dorsiflexion 5° plantar flexion 30°.       Rads:  Intact nail , well healed with no signs of loosening.  Fibular fracture healed.    A/P: 14 months status post intramedullary nail fixation of right tibia fracture with non operative treatment of fibular fracture. He is doing well overall but does have some residual knee pain and  some long-term debility.  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  He does not feel comfortable or safe going back to that level work at this point time and I agree that he should not.  He also needs to work on his stiffness in his hamstrings in that area. I do not feel likely this point he will return to that level of vigorous work in that environment.  I feel that he will more likely need to choose a less rigorous job or he can meet the medium to light heavy work set out for him in the FCE in a more stable ground environment.

## 2021-02-11 NOTE — ASSESSMENT & PLAN NOTE
52 y.o. male POD1 s/p R tibia IMN    Pain control  PT/OT: WBAT; plantar fasciitis boot when not ambulating  DVT PPx: ASA 81 BID, FCDs at all times when not ambulating  Abx: postop Ancef  Labs: Hb 13.6  Drain: none  Brasher: none    Dispo: f/u PT recs; likely home today     

## 2022-05-08 NOTE — PROGRESS NOTES
Mr. Albrecht is a 52-year-old male who fell backwards ~4 feet while trying to climb into an excavator  at work on 10/24/2018 resulting in a closed fracture of the right tibia and fibular shaft.  Patient was treated with Im nail on 10/25/2018    Mr. Albrecht is here today for a post-operative visit after a   Intramedullary nail fixation of right tibial shaft fracture  by Dr. Madrid on 10/25/2018.    IMPLANTS:  Synthes EX tibial nail 345 x 12 mm with two proximal and two distal interlocking screws    Interval History:    he reports that he is doing well.  Pain is controleld.  he is not taking pain medication.    he denies fever, chills, and sweats since the time of the surgery.     Physical exam:  Dressing taken down.  Incision is clean, dry and intact.  Sutures removed without difficulty.      RADS: none done today    Assessment:  Post-op visit ( 2 weeks)    Plan:  Current care, treatment plan, precautions, activity level/ modifications, limitations, rehabilitation exercises and proposed future treatment were discussed with the patient. We discussed the need to monitor for changes in symptoms and condition and report them to the physician.  Discussed importance of compliance with all appointments and follow up examinations.   - The patient was advised to keep the incision clean and dry for the next 24 hours after which he may wash the area with antibacterial soap in the shower. Will not submerge until the incision is completely healed  -Patient was advised to monitor wound closely and multiple times daily for any problems. Call clinic immediately or report to ED for immediate medical attention for any complications including reopening of wound, drainage, purulence, redness, streaking, odor, pain out of proportion, fever, chills, etc.   -PT/OT, [facility], Patient is responsible to establish and continue care   -range of motion as tolerated    -WBAT   - pain medication: no refill needed,    Pain medication refill  policy provided to patient for review.   - Patient is to return to clinic in 4 weeks  At time x-ray of his tib fib is needed     If there are any questions prior to scheduled follow up, the patient was instructed to contact the office    - unable to RTW at this time.    Admission

## (undated) DEVICE — SEE MEDLINE ITEM 157150

## (undated) DEVICE — SEE MEDLINE ITEM 146347

## (undated) DEVICE — GAUZE SPONGE 4X4 12PLY

## (undated) DEVICE — Device

## (undated) DEVICE — DRAPE C ARM 42 X 120 10/BX

## (undated) DEVICE — DRAPE STERI U-SHAPED 47X51IN

## (undated) DEVICE — APPLICATOR CHLORAPREP ORN 26ML

## (undated) DEVICE — SEE MEDLINE ITEM 152529

## (undated) DEVICE — DRESSING TRANS 4X4 TEGADERM

## (undated) DEVICE — TRAY MINOR ORTHO

## (undated) DEVICE — DRESSING N ADH OIL EMUL 3X3

## (undated) DEVICE — BLADE SURG CARBON STEEL #10

## (undated) DEVICE — DRAPE PLASTIC U 60X72

## (undated) DEVICE — BIT DRILL QC 3FLUTED 4.2X145 S

## (undated) DEVICE — SUT ETHICON 3-0 BLK MONO PS

## (undated) DEVICE — DRAPE C-ARM FOR MOBILE XRAY

## (undated) DEVICE — WIRE GUIDE 3.2MM 400MM
Type: IMPLANTABLE DEVICE | Site: TIBIA | Status: NON-FUNCTIONAL
Removed: 2018-10-25

## (undated) DEVICE — SEE MEDLINE ITEM 146417

## (undated) DEVICE — SEE MEDLINE ITEM 146298

## (undated) DEVICE — DRESSING SPONGE 8PLY 4X4 STRL

## (undated) DEVICE — TAPE SURG DURAPORE 2 X10YD

## (undated) DEVICE — SEE MEDLINE ITEM 152622

## (undated) DEVICE — BIT DRILL QCK-CPLG 4.2MM

## (undated) DEVICE — DRESSING N ADH OIL EMUL 3X8

## (undated) DEVICE — SUT VICRYL PLUS 2-0

## (undated) DEVICE — ELECTRODE REM PLYHSV RETURN 9

## (undated) DEVICE — DRAPE STERI INSTRUMENT 1018

## (undated) DEVICE — DRESSING AQUACEL AG ADV 3.5X6

## (undated) DEVICE — DRAPE C-ARMOR EQUIPMENT COVER

## (undated) DEVICE — DRESSING TEGASORB THIN 4X4 3/4